# Patient Record
Sex: FEMALE | Race: WHITE | NOT HISPANIC OR LATINO
[De-identification: names, ages, dates, MRNs, and addresses within clinical notes are randomized per-mention and may not be internally consistent; named-entity substitution may affect disease eponyms.]

---

## 2018-12-31 ENCOUNTER — TRANSCRIPTION ENCOUNTER (OUTPATIENT)
Age: 23
End: 2018-12-31

## 2019-01-22 ENCOUNTER — TRANSCRIPTION ENCOUNTER (OUTPATIENT)
Age: 24
End: 2019-01-22

## 2019-05-26 ENCOUNTER — TRANSCRIPTION ENCOUNTER (OUTPATIENT)
Age: 24
End: 2019-05-26

## 2020-02-25 ENCOUNTER — APPOINTMENT (OUTPATIENT)
Dept: OBGYN | Facility: CLINIC | Age: 25
End: 2020-02-25

## 2021-01-19 ENCOUNTER — APPOINTMENT (OUTPATIENT)
Dept: OBGYN | Facility: CLINIC | Age: 26
End: 2021-01-19
Payer: MEDICAID

## 2021-01-19 PROCEDURE — 99211 OFF/OP EST MAY X REQ PHY/QHP: CPT

## 2021-01-19 PROCEDURE — 99072 ADDL SUPL MATRL&STAF TM PHE: CPT

## 2021-01-21 ENCOUNTER — TRANSCRIPTION ENCOUNTER (OUTPATIENT)
Age: 26
End: 2021-01-21

## 2021-09-09 ENCOUNTER — TRANSCRIPTION ENCOUNTER (OUTPATIENT)
Age: 26
End: 2021-09-09

## 2022-01-06 ENCOUNTER — TRANSCRIPTION ENCOUNTER (OUTPATIENT)
Age: 27
End: 2022-01-06

## 2022-07-14 ENCOUNTER — APPOINTMENT (OUTPATIENT)
Dept: OBGYN | Facility: CLINIC | Age: 27
End: 2022-07-14

## 2022-07-14 VITALS — WEIGHT: 120 LBS | DIASTOLIC BLOOD PRESSURE: 76 MMHG | SYSTOLIC BLOOD PRESSURE: 111 MMHG

## 2022-07-14 PROCEDURE — 99395 PREV VISIT EST AGE 18-39: CPT

## 2022-07-14 NOTE — HISTORY OF PRESENT ILLNESS
[FreeTextEntry1] : Patient is a 26 year old presenting for an annual visit. LMP 6/23/22. She states she used condom with  and now she feels yeast infection sxs. Denies urinary urgency, frequency and dysuria. Pt states she has been trying to conceive and has been taking PNV. She states she has hashimotos thyroiditis, she says TSH and T4 are wnl.

## 2022-07-14 NOTE — PLAN
[FreeTextEntry1] : 26 year old presenting for annual exam.\par -uterus is normal sized and mobile\par -f/u PAP, GC/CT, and BV panel done today\par -f/u preconception BW done today \par -contraception: none as pt is trying to conceive \par -advised pt to start taking folic acid with PNV\par -f/u PRN

## 2022-07-18 LAB
BASOPHILS # BLD AUTO: 0.07 K/UL
BASOPHILS NFR BLD AUTO: 0.9 %
C TRACH RRNA SPEC QL NAA+PROBE: NOT DETECTED
CANDIDA VAG CYTO: NOT DETECTED
CMV IGG SERPL QL: 7.8 U/ML
CMV IGG SERPL-IMP: POSITIVE
CMV IGM SERPL QL: <8 AU/ML
CMV IGM SERPL QL: NEGATIVE
EOSINOPHIL # BLD AUTO: 0.41 K/UL
EOSINOPHIL NFR BLD AUTO: 5.5 %
G VAGINALIS+PREV SP MTYP VAG QL MICRO: NOT DETECTED
HBV E AG SER QL: NONREACTIVE
HCT VFR BLD CALC: 36.7 %
HCV AB SER QL: NONREACTIVE
HCV S/CO RATIO: 0.13 S/CO
HGB BLD-MCNC: 11.9 G/DL
HIV1+2 AB SPEC QL IA.RAPID: NONREACTIVE
HPV 16 E6+E7 MRNA CVX QL NAA+PROBE: NOT DETECTED
HPV HIGH+LOW RISK DNA PNL CVX: NOT DETECTED
HPV18+45 E6+E7 MRNA CVX QL NAA+PROBE: NOT DETECTED
IMM GRANULOCYTES NFR BLD AUTO: 0.3 %
LYMPHOCYTES # BLD AUTO: 2.63 K/UL
LYMPHOCYTES NFR BLD AUTO: 35 %
MAN DIFF?: NORMAL
MCHC RBC-ENTMCNC: 29.1 PG
MCHC RBC-ENTMCNC: 32.4 GM/DL
MCV RBC AUTO: 89.7 FL
MONOCYTES # BLD AUTO: 0.61 K/UL
MONOCYTES NFR BLD AUTO: 8.1 %
N GONORRHOEA RRNA SPEC QL NAA+PROBE: NOT DETECTED
NEUTROPHILS # BLD AUTO: 3.77 K/UL
NEUTROPHILS NFR BLD AUTO: 50.2 %
PLATELET # BLD AUTO: 248 K/UL
RBC # BLD: 4.09 M/UL
RBC # FLD: 13.2 %
RUBV IGG FLD-ACNC: 21 INDEX
RUBV IGG SER-IMP: POSITIVE
RUBV IGM FLD-ACNC: <20 AU/ML
SOURCE AMPLIFICATION: NORMAL
T GONDII AB SER-IMP: NEGATIVE
T GONDII AB SER-IMP: NEGATIVE
T GONDII IGG SER QL: <3 IU/ML
T GONDII IGM SER QL: <3 AU/ML
T PALLIDUM AB SER QL IA: NEGATIVE
T VAGINALIS VAG QL WET PREP: NOT DETECTED
VZV AB TITR SER: POSITIVE
VZV IGG SER IF-ACNC: 2448 INDEX
WBC # FLD AUTO: 7.51 K/UL

## 2022-07-19 LAB
B19V IGG SER QL IA: 0.51 INDEX
B19V IGG+IGM SER-IMP: NEGATIVE
B19V IGG+IGM SER-IMP: NORMAL
B19V IGM FLD-ACNC: 0.08 INDEX
B19V IGM SER-ACNC: NEGATIVE
CYTOLOGY CVX/VAG DOC THIN PREP: NORMAL

## 2022-07-26 ENCOUNTER — APPOINTMENT (OUTPATIENT)
Dept: OBGYN | Facility: CLINIC | Age: 27
End: 2022-07-26

## 2022-08-31 ENCOUNTER — APPOINTMENT (OUTPATIENT)
Dept: OBGYN | Facility: CLINIC | Age: 27
End: 2022-08-31

## 2022-08-31 PROCEDURE — 0501F PRENATAL FLOW SHEET: CPT

## 2022-08-31 PROCEDURE — 36415 COLL VENOUS BLD VENIPUNCTURE: CPT

## 2022-08-31 NOTE — HISTORY OF PRESENT ILLNESS
[Patient reported PAP Smear was normal] : Patient reported PAP Smear was normal [Currently Active] : currently active [Men] : men [No] : No [FreeTextEntry1] : Patient is a 26 year old who presents after she had a positive home pregnancy test. LMP 7/21/22. Pt states she has been eating cold cuts and drank wine at least 1-2 times as she didn’t know she was pregnant. Sono done today reveals yolk sac with no +FHR as it is very early GA. \par  [PapSmeardate] : 7/2022

## 2022-08-31 NOTE — PLAN
[FreeTextEntry1] : -f/u prenatal blood work drawn today\par -RTO 2 weeks for first trimester ultrasound and review of prenatal lab results

## 2022-09-01 LAB
BASOPHILS # BLD AUTO: 0.06 K/UL
BASOPHILS NFR BLD AUTO: 0.7 %
EOSINOPHIL # BLD AUTO: 0.31 K/UL
EOSINOPHIL NFR BLD AUTO: 3.8 %
ESTIMATED AVERAGE GLUCOSE: 105 MG/DL
GLUCOSE SERPL-MCNC: 80 MG/DL
HAV IGM SER QL: NONREACTIVE
HBA1C MFR BLD HPLC: 5.3 %
HBV CORE IGM SER QL: NONREACTIVE
HBV SURFACE AG SER QL: NONREACTIVE
HCT VFR BLD CALC: 35.8 %
HCV AB SER QL: NONREACTIVE
HCV S/CO RATIO: 0.09 S/CO
HGB BLD-MCNC: 11.7 G/DL
HIV1+2 AB SPEC QL IA.RAPID: NONREACTIVE
IMM GRANULOCYTES NFR BLD AUTO: 0.4 %
LYMPHOCYTES # BLD AUTO: 2.71 K/UL
LYMPHOCYTES NFR BLD AUTO: 33.1 %
MAN DIFF?: NORMAL
MCHC RBC-ENTMCNC: 29 PG
MCHC RBC-ENTMCNC: 32.7 GM/DL
MCV RBC AUTO: 88.8 FL
MONOCYTES # BLD AUTO: 0.65 K/UL
MONOCYTES NFR BLD AUTO: 7.9 %
NEUTROPHILS # BLD AUTO: 4.43 K/UL
NEUTROPHILS NFR BLD AUTO: 54.1 %
PLATELET # BLD AUTO: 246 K/UL
RBC # BLD: 4.03 M/UL
RBC # FLD: 12.7 %
WBC # FLD AUTO: 8.19 K/UL

## 2022-09-05 LAB
ABO + RH PNL BLD: NORMAL
APPEARANCE: CLEAR
BACTERIA UR CULT: NORMAL
BACTERIA: NEGATIVE
BILIRUBIN URINE: NEGATIVE
BLD GP AB SCN SERPL QL: NORMAL
BLOOD URINE: NEGATIVE
CALCIUM OXALATE CRYSTALS: ABNORMAL
COLOR: YELLOW
GLUCOSE QUALITATIVE U: NEGATIVE
HYALINE CASTS: 0 /LPF
KETONES URINE: NORMAL
LEAD BLD-MCNC: <1 UG/DL
LEUKOCYTE ESTERASE URINE: NEGATIVE
M TB IFN-G BLD-IMP: NEGATIVE
MEV IGG FLD QL IA: 59.1 AU/ML
MEV IGG+IGM SER-IMP: POSITIVE
MICROSCOPIC-UA: NORMAL
MUV AB SER-ACNC: POSITIVE
MUV IGG SER QL IA: 39.3 AU/ML
NITRITE URINE: NEGATIVE
PH URINE: 6
PROTEIN URINE: NEGATIVE
QUANTIFERON TB PLUS MITOGEN MINUS NIL: >10 IU/ML
QUANTIFERON TB PLUS NIL: 0.1 IU/ML
QUANTIFERON TB PLUS TB1 MINUS NIL: -0.01 IU/ML
QUANTIFERON TB PLUS TB2 MINUS NIL: 0 IU/ML
RED BLOOD CELLS URINE: 1 /HPF
RUBV IGG FLD-ACNC: 19.2 INDEX
RUBV IGG SER-IMP: POSITIVE
SPECIFIC GRAVITY URINE: 1.02
SQUAMOUS EPITHELIAL CELLS: 4 /HPF
T PALLIDUM AB SER QL IA: NEGATIVE
UROBILINOGEN URINE: NORMAL
WHITE BLOOD CELLS URINE: 1 /HPF

## 2022-09-14 ENCOUNTER — APPOINTMENT (OUTPATIENT)
Dept: ANTEPARTUM | Facility: CLINIC | Age: 27
End: 2022-09-14

## 2022-09-14 ENCOUNTER — APPOINTMENT (OUTPATIENT)
Dept: OBGYN | Facility: CLINIC | Age: 27
End: 2022-09-14

## 2022-09-14 VITALS
HEIGHT: 61 IN | SYSTOLIC BLOOD PRESSURE: 106 MMHG | DIASTOLIC BLOOD PRESSURE: 71 MMHG | WEIGHT: 121 LBS | BODY MASS INDEX: 22.84 KG/M2

## 2022-09-14 PROCEDURE — 0502F SUBSEQUENT PRENATAL CARE: CPT

## 2022-09-14 PROCEDURE — 76801 OB US < 14 WKS SINGLE FETUS: CPT

## 2022-10-12 ENCOUNTER — APPOINTMENT (OUTPATIENT)
Dept: ANTEPARTUM | Facility: CLINIC | Age: 27
End: 2022-10-12

## 2022-10-12 ENCOUNTER — APPOINTMENT (OUTPATIENT)
Dept: OBGYN | Facility: CLINIC | Age: 27
End: 2022-10-12

## 2022-10-12 VITALS
HEIGHT: 61 IN | SYSTOLIC BLOOD PRESSURE: 105 MMHG | WEIGHT: 122 LBS | DIASTOLIC BLOOD PRESSURE: 65 MMHG | BODY MASS INDEX: 23.03 KG/M2

## 2022-10-12 PROCEDURE — 76813 OB US NUCHAL MEAS 1 GEST: CPT

## 2022-10-12 PROCEDURE — 0502F SUBSEQUENT PRENATAL CARE: CPT

## 2022-10-18 LAB
ADDITIONAL US: NORMAL
COMMENTS: AFP: NORMAL
CRL SCAN TWIN B: NORMAL
CRL SCAN: NORMAL
CROWN RUMP LENGTH TWIN B: NORMAL
CROWN RUMP LENGTH: 48.1 MM
DOWN SYNDROME AGE RISK: NORMAL
DOWN SYNDROME INTERPRETATION: NORMAL
DOWN SYNDROME SCREENING RISK: NORMAL
GEST. AGE ON COLLECTION DATE: 11.4 WEEKS
HCG MOM: 1.39
HCG VALUE: 178.8 IU/ML
MATERNAL AGE AT EDD: 26.5 YR
NOTE: AFP: NORMAL
NT MOM TWIN B: NORMAL
NT TWIN B: NORMAL
NUCHAL TRANSLUCENCY (NT): 1 MM
NUCHAL TRANSLUCENCY MOM: 0.79
NUMBER OF FETUSES: 1
PAPP-A MOM: 1.3
PAPP-A VALUE: 1032.7 NG/ML
RACE: NORMAL
RESULTS AFP: NORMAL
SONOGRAPHER ID#: NORMAL
SUBMIT PART 2 SAMPLE USING: NORMAL
TEST RESULTS: AFP: NORMAL
TRISOMY 18 AGE RISK: NORMAL
TRISOMY 18 INTERPRETATION: NORMAL
TRISOMY 18 SCREENING RISK: NORMAL
WEIGHT AFP: 122 LBS

## 2022-10-20 LAB
CLARI ADDITIONAL INFO: NORMAL
CLARI CHROMOSOME 13: NORMAL
CLARI CHROMOSOME 18: NORMAL
CLARI CHROMOSOME 21: NORMAL
CLARI SEX CHROMOSOMES: NORMAL
CLARI TEST COMMENT: NORMAL
CLARITEST NIPT: NORMAL
FETAL FRACT: NORMAL
GESTATION AGE: NORMAL
MATERNAL WEIGHT (LBS):: NORMAL
PLEASE INCLUDE GENDER RESULTS ON THIS REPORT:: NORMAL
TYPE OF PREGNANCY:: NORMAL

## 2022-11-07 ENCOUNTER — APPOINTMENT (OUTPATIENT)
Dept: OBGYN | Facility: CLINIC | Age: 27
End: 2022-11-07

## 2022-11-07 VITALS
SYSTOLIC BLOOD PRESSURE: 104 MMHG | WEIGHT: 124 LBS | DIASTOLIC BLOOD PRESSURE: 68 MMHG | BODY MASS INDEX: 23.41 KG/M2 | HEIGHT: 61 IN

## 2022-11-07 PROCEDURE — 0502F SUBSEQUENT PRENATAL CARE: CPT

## 2022-11-09 ENCOUNTER — NON-APPOINTMENT (OUTPATIENT)
Age: 27
End: 2022-11-09

## 2022-11-09 LAB
25(OH)D3 SERPL-MCNC: 35.3 NG/ML
T4 FREE SERPL-MCNC: 0.9 NG/DL
TSH SERPL-ACNC: 9.62 UIU/ML
VIT B12 SERPL-MCNC: 556 PG/ML

## 2022-11-10 ENCOUNTER — NON-APPOINTMENT (OUTPATIENT)
Age: 27
End: 2022-11-10

## 2022-11-10 LAB
ADDITIONAL US: NORMAL
AFP MOM: 1.01
AFP VALUE: 33.3 NG/ML
COLLECTED ON 2: NORMAL
COLLECTED ON: NORMAL
CRL SCAN TWIN B: NORMAL
CRL SCAN: NORMAL
CROWN RUMP LENGTH TWIN B: NORMAL
CROWN RUMP LENGTH: 48.1 MM
DIA MOM: 1.45
DIA VALUE: 275.7 PG/ML
DOWN SYNDROME AGE RISK: NORMAL
DOWN SYNDROME INTERPRETATION: NORMAL
DOWN SYNDROME SCREENING RISK: NORMAL
FIRST TRIMESTER SAMPLE: NORMAL
GEST. AGE ON COLLECTION DATE: 11.4 WEEKS
GESTATIONAL AGE: 15.1 WEEKS
HCG MOM: 1.59
HCG VALUE: 87.4 IU/ML
INSULIN DEP DIABETES: NO
MATERNAL AGE AT EDD: 26.5 YR
NT MOM TWIN B: NORMAL
NT TWIN B: NORMAL
NUCHAL TRANSLUCENCY (NT): 1 MM
NUCHAL TRANSLUCENCY MOM: 0.79
NUMBER OF FETUSES: 1
OPEN SPINA BIFIDA: NORMAL
OSB INTERPRETATION: NORMAL
PAPP-A MOM: 1.3
PAPP-A VALUE: 1032.7 NG/ML
RACE: NORMAL
SECOND TRIMESTER SAMPLE: NORMAL
SEQUENTIAL 2 COMMENTS: NORMAL
SEQUENTIAL 2 NOTE: NORMAL
SEQUENTIAL 2 RESULTS: NORMAL
SEQUENTIAL 2 TEST RESULTS: NORMAL
SONOGRAPHER ID#: NORMAL
TRISOMY 18 AGE RISK: NORMAL
TRISOMY 18 INTERPRETATION: NORMAL
TRISOMY 18 SCREENING RISK: NORMAL
UE3 MOM: 1.03
UE3 VALUE: 0.85 NG/ML
WEIGHT AFP: 122 LBS
WEIGHT: 124 LBS

## 2022-11-14 ENCOUNTER — APPOINTMENT (OUTPATIENT)
Dept: OBGYN | Facility: CLINIC | Age: 27
End: 2022-11-14

## 2022-11-14 VITALS — SYSTOLIC BLOOD PRESSURE: 96 MMHG | DIASTOLIC BLOOD PRESSURE: 56 MMHG | BODY MASS INDEX: 23.43 KG/M2 | WEIGHT: 124 LBS

## 2022-11-14 PROCEDURE — 99212 OFFICE O/P EST SF 10 MIN: CPT

## 2022-11-14 RX ORDER — LEVOTHYROXINE SODIUM 0.05 MG/1
50 TABLET ORAL DAILY
Qty: 30 | Refills: 3 | Status: ACTIVE | COMMUNITY
Start: 2022-11-14 | End: 1900-01-01

## 2022-11-15 LAB
T4 FREE SERPL-MCNC: 0.9 NG/DL
TSH SERPL-ACNC: 7.7 UIU/ML

## 2022-11-16 RX ORDER — LEVOTHYROXINE SODIUM 0.03 MG/1
25 TABLET ORAL DAILY
Qty: 90 | Refills: 2 | Status: ACTIVE | COMMUNITY
Start: 2022-11-15 | End: 1900-01-01

## 2022-12-07 ENCOUNTER — APPOINTMENT (OUTPATIENT)
Dept: OBGYN | Facility: CLINIC | Age: 27
End: 2022-12-07

## 2022-12-07 ENCOUNTER — APPOINTMENT (OUTPATIENT)
Dept: ANTEPARTUM | Facility: CLINIC | Age: 27
End: 2022-12-07

## 2022-12-07 VITALS
SYSTOLIC BLOOD PRESSURE: 102 MMHG | DIASTOLIC BLOOD PRESSURE: 68 MMHG | BODY MASS INDEX: 23.98 KG/M2 | HEIGHT: 61 IN | WEIGHT: 127 LBS

## 2022-12-07 PROCEDURE — 0502F SUBSEQUENT PRENATAL CARE: CPT

## 2022-12-07 PROCEDURE — 76805 OB US >/= 14 WKS SNGL FETUS: CPT

## 2022-12-08 ENCOUNTER — NON-APPOINTMENT (OUTPATIENT)
Age: 27
End: 2022-12-08

## 2022-12-08 LAB
T4 FREE SERPL-MCNC: 0.9 NG/DL
TSH SERPL-ACNC: 3.72 UIU/ML

## 2023-01-11 ENCOUNTER — APPOINTMENT (OUTPATIENT)
Dept: OBGYN | Facility: CLINIC | Age: 28
End: 2023-01-11
Payer: MEDICAID

## 2023-01-11 VITALS — DIASTOLIC BLOOD PRESSURE: 64 MMHG | SYSTOLIC BLOOD PRESSURE: 99 MMHG | WEIGHT: 133 LBS | BODY MASS INDEX: 25.13 KG/M2

## 2023-01-11 PROCEDURE — 36415 COLL VENOUS BLD VENIPUNCTURE: CPT

## 2023-01-11 PROCEDURE — 0502F SUBSEQUENT PRENATAL CARE: CPT

## 2023-01-12 LAB
BASOPHILS # BLD AUTO: 0.07 K/UL
BASOPHILS NFR BLD AUTO: 0.7 %
EOSINOPHIL # BLD AUTO: 0.27 K/UL
EOSINOPHIL NFR BLD AUTO: 2.6 %
GLUCOSE 1H P 50 G GLC PO SERPL-MCNC: 127 MG/DL
HCT VFR BLD CALC: 33.8 %
HGB BLD-MCNC: 10.6 G/DL
IMM GRANULOCYTES NFR BLD AUTO: 1.4 %
LYMPHOCYTES # BLD AUTO: 2.05 K/UL
LYMPHOCYTES NFR BLD AUTO: 19.5 %
MAN DIFF?: NORMAL
MCHC RBC-ENTMCNC: 29.9 PG
MCHC RBC-ENTMCNC: 31.4 GM/DL
MCV RBC AUTO: 95.2 FL
MONOCYTES # BLD AUTO: 0.66 K/UL
MONOCYTES NFR BLD AUTO: 6.3 %
NEUTROPHILS # BLD AUTO: 7.31 K/UL
NEUTROPHILS NFR BLD AUTO: 69.5 %
PLATELET # BLD AUTO: 220 K/UL
RBC # BLD: 3.55 M/UL
RBC # FLD: 14 %
TSH SERPL-ACNC: 3.6 UIU/ML
WBC # FLD AUTO: 10.51 K/UL

## 2023-01-12 RX ORDER — MULTIVIT-MIN/FOLIC/VIT K/LYCOP 400-300MCG
250 TABLET ORAL DAILY
Qty: 30 | Refills: 6 | Status: ACTIVE | COMMUNITY
Start: 2023-01-12 | End: 1900-01-01

## 2023-01-12 RX ORDER — FERROUS SULFATE TAB EC 325 MG (65 MG FE EQUIVALENT) 325 (65 FE) MG
325 (65 FE) TABLET DELAYED RESPONSE ORAL DAILY
Qty: 30 | Refills: 6 | Status: ACTIVE | COMMUNITY
Start: 2023-01-12 | End: 1900-01-01

## 2023-01-17 ENCOUNTER — APPOINTMENT (OUTPATIENT)
Dept: OBGYN | Facility: CLINIC | Age: 28
End: 2023-01-17
Payer: MEDICAID

## 2023-01-17 ENCOUNTER — NON-APPOINTMENT (OUTPATIENT)
Age: 28
End: 2023-01-17

## 2023-01-17 VITALS
SYSTOLIC BLOOD PRESSURE: 102 MMHG | HEART RATE: 99 BPM | BODY MASS INDEX: 26.43 KG/M2 | HEIGHT: 61 IN | WEIGHT: 140 LBS | DIASTOLIC BLOOD PRESSURE: 69 MMHG

## 2023-01-17 DIAGNOSIS — R42 DIZZINESS AND GIDDINESS: ICD-10-CM

## 2023-01-17 PROCEDURE — 36415 COLL VENOUS BLD VENIPUNCTURE: CPT

## 2023-01-17 PROCEDURE — 0502F SUBSEQUENT PRENATAL CARE: CPT

## 2023-01-18 LAB
FERRITIN SERPL-MCNC: 20 NG/ML
FOLATE SERPL-MCNC: >20 NG/ML
IRON SATN MFR SERPL: 23 %
IRON SERPL-MCNC: 94 UG/DL
T4 FREE SERPL-MCNC: 0.8 NG/DL
TIBC SERPL-MCNC: 412 UG/DL
UIBC SERPL-MCNC: 317 UG/DL
VIT B12 SERPL-MCNC: 499 PG/ML

## 2023-01-19 LAB — TSH SERPL-ACNC: 3.95 UIU/ML

## 2023-02-08 ENCOUNTER — APPOINTMENT (OUTPATIENT)
Dept: OBGYN | Facility: CLINIC | Age: 28
End: 2023-02-08
Payer: MEDICAID

## 2023-02-08 VITALS
WEIGHT: 141.13 LBS | BODY MASS INDEX: 26.67 KG/M2 | SYSTOLIC BLOOD PRESSURE: 103 MMHG | DIASTOLIC BLOOD PRESSURE: 71 MMHG

## 2023-02-08 PROCEDURE — 0502F SUBSEQUENT PRENATAL CARE: CPT

## 2023-02-27 ENCOUNTER — APPOINTMENT (OUTPATIENT)
Dept: OBGYN | Facility: CLINIC | Age: 28
End: 2023-02-27
Payer: MEDICAID

## 2023-02-27 ENCOUNTER — APPOINTMENT (OUTPATIENT)
Dept: ANTEPARTUM | Facility: CLINIC | Age: 28
End: 2023-02-27
Payer: MEDICAID

## 2023-02-27 VITALS — WEIGHT: 147 LBS | SYSTOLIC BLOOD PRESSURE: 106 MMHG | BODY MASS INDEX: 27.78 KG/M2 | DIASTOLIC BLOOD PRESSURE: 68 MMHG

## 2023-02-27 PROCEDURE — 0502F SUBSEQUENT PRENATAL CARE: CPT

## 2023-02-27 PROCEDURE — 76816 OB US FOLLOW-UP PER FETUS: CPT

## 2023-02-27 PROCEDURE — 76819 FETAL BIOPHYS PROFIL W/O NST: CPT | Mod: 59

## 2023-03-15 ENCOUNTER — NON-APPOINTMENT (OUTPATIENT)
Age: 28
End: 2023-03-15

## 2023-03-15 ENCOUNTER — APPOINTMENT (OUTPATIENT)
Dept: OBGYN | Facility: CLINIC | Age: 28
End: 2023-03-15
Payer: MEDICAID

## 2023-03-15 VITALS — DIASTOLIC BLOOD PRESSURE: 71 MMHG | WEIGHT: 146 LBS | SYSTOLIC BLOOD PRESSURE: 104 MMHG | BODY MASS INDEX: 27.59 KG/M2

## 2023-03-15 LAB
T4 FREE SERPL-MCNC: 0.8 NG/DL
TSH SERPL-ACNC: 3.04 UIU/ML

## 2023-03-15 PROCEDURE — 0502F SUBSEQUENT PRENATAL CARE: CPT

## 2023-03-15 RX ORDER — TERCONAZOLE 4 MG/G
0.4 CREAM VAGINAL
Qty: 1 | Refills: 0 | Status: ACTIVE | COMMUNITY
Start: 2023-03-15 | End: 1900-01-01

## 2023-03-29 ENCOUNTER — APPOINTMENT (OUTPATIENT)
Dept: OBGYN | Facility: CLINIC | Age: 28
End: 2023-03-29
Payer: MEDICAID

## 2023-03-29 VITALS — BODY MASS INDEX: 28.34 KG/M2 | DIASTOLIC BLOOD PRESSURE: 66 MMHG | SYSTOLIC BLOOD PRESSURE: 95 MMHG | WEIGHT: 150 LBS

## 2023-03-29 PROCEDURE — 0502F SUBSEQUENT PRENATAL CARE: CPT

## 2023-03-30 LAB — HIV1+2 AB SPEC QL IA.RAPID: NONREACTIVE

## 2023-04-03 LAB — B-HEM STREP SPEC QL CULT: NORMAL

## 2023-04-04 ENCOUNTER — APPOINTMENT (OUTPATIENT)
Dept: OBGYN | Facility: CLINIC | Age: 28
End: 2023-04-04
Payer: MEDICAID

## 2023-04-04 VITALS
SYSTOLIC BLOOD PRESSURE: 99 MMHG | HEIGHT: 61 IN | WEIGHT: 154 LBS | BODY MASS INDEX: 29.07 KG/M2 | DIASTOLIC BLOOD PRESSURE: 66 MMHG

## 2023-04-04 PROCEDURE — 0502F SUBSEQUENT PRENATAL CARE: CPT

## 2023-04-11 ENCOUNTER — APPOINTMENT (OUTPATIENT)
Dept: OBGYN | Facility: CLINIC | Age: 28
End: 2023-04-11
Payer: MEDICAID

## 2023-04-11 ENCOUNTER — APPOINTMENT (OUTPATIENT)
Dept: ANTEPARTUM | Facility: CLINIC | Age: 28
End: 2023-04-11
Payer: MEDICAID

## 2023-04-11 VITALS — SYSTOLIC BLOOD PRESSURE: 103 MMHG | DIASTOLIC BLOOD PRESSURE: 70 MMHG | BODY MASS INDEX: 29.67 KG/M2 | WEIGHT: 157 LBS

## 2023-04-11 PROCEDURE — 76816 OB US FOLLOW-UP PER FETUS: CPT

## 2023-04-11 PROCEDURE — 0502F SUBSEQUENT PRENATAL CARE: CPT

## 2023-04-11 PROCEDURE — 76819 FETAL BIOPHYS PROFIL W/O NST: CPT | Mod: 59

## 2023-04-19 ENCOUNTER — APPOINTMENT (OUTPATIENT)
Dept: OBGYN | Facility: CLINIC | Age: 28
End: 2023-04-19
Payer: MEDICAID

## 2023-04-19 VITALS — SYSTOLIC BLOOD PRESSURE: 109 MMHG | WEIGHT: 160 LBS | BODY MASS INDEX: 30.23 KG/M2 | DIASTOLIC BLOOD PRESSURE: 74 MMHG

## 2023-04-19 PROCEDURE — 0502F SUBSEQUENT PRENATAL CARE: CPT

## 2023-04-25 ENCOUNTER — APPOINTMENT (OUTPATIENT)
Dept: OBGYN | Facility: CLINIC | Age: 28
End: 2023-04-25
Payer: MEDICAID

## 2023-04-25 ENCOUNTER — NON-APPOINTMENT (OUTPATIENT)
Age: 28
End: 2023-04-25

## 2023-04-25 VITALS
SYSTOLIC BLOOD PRESSURE: 115 MMHG | DIASTOLIC BLOOD PRESSURE: 72 MMHG | BODY MASS INDEX: 29.63 KG/M2 | HEIGHT: 62 IN | WEIGHT: 161 LBS

## 2023-04-25 PROCEDURE — 0502F SUBSEQUENT PRENATAL CARE: CPT

## 2023-04-26 ENCOUNTER — NON-APPOINTMENT (OUTPATIENT)
Age: 28
End: 2023-04-26

## 2023-04-27 ENCOUNTER — NON-APPOINTMENT (OUTPATIENT)
Age: 28
End: 2023-04-27

## 2023-04-27 ENCOUNTER — APPOINTMENT (OUTPATIENT)
Dept: OBGYN | Facility: CLINIC | Age: 28
End: 2023-04-27
Payer: MEDICAID

## 2023-04-27 ENCOUNTER — APPOINTMENT (OUTPATIENT)
Dept: ANTEPARTUM | Facility: CLINIC | Age: 28
End: 2023-04-27
Payer: MEDICAID

## 2023-04-27 VITALS — BODY MASS INDEX: 29.81 KG/M2 | WEIGHT: 163 LBS | DIASTOLIC BLOOD PRESSURE: 73 MMHG | SYSTOLIC BLOOD PRESSURE: 110 MMHG

## 2023-04-27 PROCEDURE — 76818 FETAL BIOPHYS PROFILE W/NST: CPT

## 2023-04-27 PROCEDURE — 0502F SUBSEQUENT PRENATAL CARE: CPT

## 2023-04-28 ENCOUNTER — OUTPATIENT (OUTPATIENT)
Dept: INPATIENT UNIT | Facility: HOSPITAL | Age: 28
LOS: 1 days | Discharge: HOME | End: 2023-04-28
Payer: MEDICAID

## 2023-04-28 VITALS — HEART RATE: 92 BPM | SYSTOLIC BLOOD PRESSURE: 102 MMHG | DIASTOLIC BLOOD PRESSURE: 60 MMHG

## 2023-04-28 VITALS — TEMPERATURE: 98 F

## 2023-04-28 DIAGNOSIS — O26.899 OTHER SPECIFIED PREGNANCY RELATED CONDITIONS, UNSPECIFIED TRIMESTER: ICD-10-CM

## 2023-04-28 LAB
BASOPHILS # BLD AUTO: 0.07 K/UL — SIGNIFICANT CHANGE UP (ref 0–0.2)
BASOPHILS NFR BLD AUTO: 0.3 % — SIGNIFICANT CHANGE UP (ref 0–2)
EOSINOPHIL # BLD AUTO: 0.03 K/UL — SIGNIFICANT CHANGE UP (ref 0–0.5)
EOSINOPHIL NFR BLD AUTO: 0.1 % — SIGNIFICANT CHANGE UP (ref 0–6)
FLUAV AG NPH QL: SIGNIFICANT CHANGE UP
FLUBV AG NPH QL: SIGNIFICANT CHANGE UP
HCT VFR BLD CALC: 34.1 % — LOW (ref 34.5–45)
HGB BLD-MCNC: 11.5 G/DL — SIGNIFICANT CHANGE UP (ref 11.5–15.5)
IANC: 18.44 K/UL — HIGH (ref 1.8–7.4)
IMM GRANULOCYTES NFR BLD AUTO: 1.5 % — HIGH (ref 0–0.9)
LYMPHOCYTES # BLD AUTO: 2.14 K/UL — SIGNIFICANT CHANGE UP (ref 1–3.3)
LYMPHOCYTES # BLD AUTO: 9.6 % — LOW (ref 13–44)
MCHC RBC-ENTMCNC: 29.7 PG — SIGNIFICANT CHANGE UP (ref 27–34)
MCHC RBC-ENTMCNC: 33.7 GM/DL — SIGNIFICANT CHANGE UP (ref 32–36)
MCV RBC AUTO: 88.1 FL — SIGNIFICANT CHANGE UP (ref 80–100)
MONOCYTES # BLD AUTO: 1.38 K/UL — HIGH (ref 0–0.9)
MONOCYTES NFR BLD AUTO: 6.2 % — SIGNIFICANT CHANGE UP (ref 2–14)
NEUTROPHILS # BLD AUTO: 18.44 K/UL — HIGH (ref 1.8–7.4)
NEUTROPHILS NFR BLD AUTO: 82.3 % — HIGH (ref 43–77)
NRBC # BLD: 0 /100 WBCS — SIGNIFICANT CHANGE UP (ref 0–0)
NRBC # FLD: 0 K/UL — SIGNIFICANT CHANGE UP (ref 0–0)
PLATELET # BLD AUTO: 188 K/UL — SIGNIFICANT CHANGE UP (ref 150–400)
RBC # BLD: 3.87 M/UL — SIGNIFICANT CHANGE UP (ref 3.8–5.2)
RBC # FLD: 13.4 % — SIGNIFICANT CHANGE UP (ref 10.3–14.5)
RSV RNA NPH QL NAA+NON-PROBE: SIGNIFICANT CHANGE UP
SARS-COV-2 RNA SPEC QL NAA+PROBE: SIGNIFICANT CHANGE UP
WBC # BLD: 22.39 K/UL — HIGH (ref 3.8–10.5)
WBC # FLD AUTO: 22.39 K/UL — HIGH (ref 3.8–10.5)

## 2023-04-28 PROCEDURE — 59025 FETAL NON-STRESS TEST: CPT | Mod: 26

## 2023-04-28 PROCEDURE — 99221 1ST HOSP IP/OBS SF/LOW 40: CPT | Mod: 25

## 2023-04-28 NOTE — OB PROVIDER TRIAGE NOTE - NSOBPROVIDERNOTE_OBGYN_ALL_OB_FT
26 y/o  at 40 weeks presents with fever x 2 days and irregular contractions  - CBC and flu/covid panel ordered  - Patient declines a VE 26 y/o  at 40 weeks presents with fever x 2 days and irregular contractions  - Afebrile, vitals stable  - CBC and flu/covid panel ordered  - Patient declines a VE 26 y/o  at 40 weeks presents with fever x 2 days and irregular contractions  - Afebrile, vitals stable  - CBC and flu/covid panel ordered  - Patient declines a VE    - Flu/covid negative, patient feels well  - Reactive NST, BPP   - Patient stable for discharge home with adequate outpatient follow up  - Instructed patient to follow up with OB/GYN within 1 week  - Educated and discussed labor precautions  - Educated and discussed concerning signs/symptoms to call OB/GYN and return to L&D including but not limited to vaginal bleeding, leakage of fluid, painful contractions, decreased fetal movement, fever/chills, shortness of breath, chest pain, rash, difficulty ambulating, nausea/vomiting/diarrhea, worsening of symptoms  - Patient states she understands and agrees with assessment and plan, all questions answered  - Discussed with Dr. Atkins  - Patient discharged at 3:45am

## 2023-04-28 NOTE — OB PROVIDER TRIAGE NOTE - NSHPLABSRESULTS_GEN_ALL_CORE
Alert and oriented, no focal deficits, no motor or sensory deficits. CBC Full  -  ( 28 Apr 2023 01:59 )  WBC Count : 22.39 K/uL  RBC Count : 3.87 M/uL  Hemoglobin : 11.5 g/dL  Hematocrit : 34.1 %  Platelet Count - Automated : 188 K/uL  Mean Cell Volume : 88.1 fL  Mean Cell Hemoglobin : 29.7 pg  Mean Cell Hemoglobin Concentration : 33.7 gm/dL  Auto Neutrophil # : 18.44 K/uL  Auto Lymphocyte # : 2.14 K/uL  Auto Monocyte # : 1.38 K/uL  Auto Eosinophil # : 0.03 K/uL  Auto Basophil # : 0.07 K/uL  Auto Neutrophil % : 82.3 %  Auto Lymphocyte % : 9.6 %  Auto Monocyte % : 6.2 %  Auto Eosinophil % : 0.1 %  Auto Basophil % : 0.3 % CBC Full  -  ( 28 Apr 2023 01:59 )  WBC Count : 22.39 K/uL  RBC Count : 3.87 M/uL  Hemoglobin : 11.5 g/dL  Hematocrit : 34.1 %  Platelet Count - Automated : 188 K/uL  Mean Cell Volume : 88.1 fL  Mean Cell Hemoglobin : 29.7 pg  Mean Cell Hemoglobin Concentration : 33.7 gm/dL  Auto Neutrophil # : 18.44 K/uL  Auto Lymphocyte # : 2.14 K/uL  Auto Monocyte # : 1.38 K/uL  Auto Eosinophil # : 0.03 K/uL  Auto Basophil # : 0.07 K/uL  Auto Neutrophil % : 82.3 %  Auto Lymphocyte % : 9.6 %  Auto Monocyte % : 6.2 %  Auto Eosinophil % : 0.1 %  Auto Basophil % : 0.3 %    Flu/covid panel negative

## 2023-04-28 NOTE — OB PROVIDER TRIAGE NOTE - ADDITIONAL INSTRUCTIONS
- Patient stable for discharge home with adequate outpatient follow up  - Instructed patient to follow up with OB/GYN within 1 week  - Educated and discussed labor precautions  - Educated and discussed concerning signs/symptoms to call OB/GYN and return to L&D including but not limited to vaginal bleeding, leakage of fluid, painful contractions, decreased fetal movement, fever/chills, shortness of breath, chest pain, rash, difficulty ambulating, nausea/vomiting/diarrhea, worsening of symptoms  - Patient states she understands and agrees with assessment and plan, all questions answered

## 2023-04-28 NOTE — OB RN TRIAGE NOTE - FALL HARM RISK - UNIVERSAL INTERVENTIONS
Bed in lowest position, wheels locked, appropriate side rails in place/Call bell, personal items and telephone in reach/Instruct patient to call for assistance before getting out of bed or chair/Non-slip footwear when patient is out of bed/[place] to call system/Physically safe environment - no spills, clutter or unnecessary equipment/Purposeful Proactive Rounding/Room/bathroom lighting operational, light cord in reach Hydroxychloroquine Counseling:  I discussed with the patient that a baseline ophthalmologic exam is needed at the start of therapy and every year thereafter while on therapy. A CBC may also be warranted for monitoring.  The side effects of this medication were discussed with the patient, including but not limited to agranulocytosis, aplastic anemia, seizures, rashes, retinopathy, and liver toxicity. Patient instructed to call the office should any adverse effect occur.  The patient verbalized understanding of the proper use and possible adverse effects of Plaquenil.  All the patient's questions and concerns were addressed.

## 2023-04-28 NOTE — OB PROVIDER TRIAGE NOTE - NSHPPHYSICALEXAM_GEN_ALL_CORE
Vital Signs Last 24 Hrs  T(C): 37.1 (28 Apr 2023 00:57), Max: 37.1 (28 Apr 2023 00:51)  T(F): 98.8 (28 Apr 2023 00:57), Max: 98.8 (28 Apr 2023 00:57)  HR: 97 (28 Apr 2023 01:47) (89 - 105)  BP: 108/63 (28 Apr 2023 00:57) (108/63 - 108/63)  BP(mean): --  RR: 18 (28 Apr 2023 00:57) (18 - 18)  SpO2: 98% (28 Apr 2023 01:47) (98% - 99%)    Parameters below as of 28 Apr 2023 00:57  Patient On (Oxygen Delivery Method): room air    A&O x 3  Lungs: CTAB  Abd: gravid, soft nontender to palpation  EFM: baseline 125, moderate variability, + accels, no decels, ctx q 3-4min  SVE: patient declined  TAS: presentation, placenta, BPP JJ Vital Signs Last 24 Hrs  T(C): 37.1 (28 Apr 2023 00:57), Max: 37.1 (28 Apr 2023 00:51)  T(F): 98.8 (28 Apr 2023 00:57), Max: 98.8 (28 Apr 2023 00:57)  HR: 97 (28 Apr 2023 01:47) (89 - 105)  BP: 108/63 (28 Apr 2023 00:57) (108/63 - 108/63)  BP(mean): --  RR: 18 (28 Apr 2023 00:57) (18 - 18)  SpO2: 98% (28 Apr 2023 01:47) (98% - 99%)    Parameters below as of 28 Apr 2023 00:57  Patient On (Oxygen Delivery Method): room air    A&O x 3  Lungs: CTAB  Abd: gravid, soft nontender to palpation  EFM: baseline 125, moderate variability, + accels, no decels, ctx q 3-4min  reactive NST  SVE: patient declined  TAS: vertex presentation, anterior placenta, BPP 8/8 JJ 20.1  images saved on sonogram machine

## 2023-04-28 NOTE — OB PROVIDER TRIAGE NOTE - HISTORY OF PRESENT ILLNESS
26 y/o  at 40 weeks presents with fever x 2 days and irregular contractions. States it began  around 10pm with the highest temperature of 101F. States her fever broke a few hours later without any Tylenol but did have chills after. Patient was seen in the OB office yesterday morning and was found to be 1cm dilated. Patient states fever returned after the appointment with the highest temperature of 102F. Patient reports she had diarrhea and nausea before the fever began but none now. Denies headache, cough, sore throat, SOB, chest pain, dysuria, leakage of clear fluid, vaginal bleeding.

## 2023-05-01 ENCOUNTER — APPOINTMENT (OUTPATIENT)
Dept: ANTEPARTUM | Facility: CLINIC | Age: 28
End: 2023-05-01
Payer: MEDICAID

## 2023-05-01 ENCOUNTER — APPOINTMENT (OUTPATIENT)
Dept: OBGYN | Facility: CLINIC | Age: 28
End: 2023-05-01
Payer: MEDICAID

## 2023-05-01 VITALS — SYSTOLIC BLOOD PRESSURE: 113 MMHG | WEIGHT: 162 LBS | DIASTOLIC BLOOD PRESSURE: 80 MMHG | BODY MASS INDEX: 29.63 KG/M2

## 2023-05-01 PROCEDURE — 0502F SUBSEQUENT PRENATAL CARE: CPT

## 2023-05-01 PROCEDURE — 76816 OB US FOLLOW-UP PER FETUS: CPT

## 2023-05-01 PROCEDURE — 76818 FETAL BIOPHYS PROFILE W/NST: CPT | Mod: 59

## 2023-05-02 DIAGNOSIS — O26.893 OTHER SPECIFIED PREGNANCY RELATED CONDITIONS, THIRD TRIMESTER: ICD-10-CM

## 2023-05-02 DIAGNOSIS — Z20.822 CONTACT WITH AND (SUSPECTED) EXPOSURE TO COVID-19: ICD-10-CM

## 2023-05-02 DIAGNOSIS — Z3A.40 40 WEEKS GESTATION OF PREGNANCY: ICD-10-CM

## 2023-05-02 DIAGNOSIS — O48.0 POST-TERM PREGNANCY: ICD-10-CM

## 2023-05-02 DIAGNOSIS — R50.9 FEVER, UNSPECIFIED: ICD-10-CM

## 2023-05-02 DIAGNOSIS — O47.1 FALSE LABOR AT OR AFTER 37 COMPLETED WEEKS OF GESTATION: ICD-10-CM

## 2023-05-03 ENCOUNTER — APPOINTMENT (OUTPATIENT)
Dept: ANTEPARTUM | Facility: CLINIC | Age: 28
End: 2023-05-03
Payer: MEDICAID

## 2023-05-03 ENCOUNTER — ASOB RESULT (OUTPATIENT)
Age: 28
End: 2023-05-03

## 2023-05-03 ENCOUNTER — NON-APPOINTMENT (OUTPATIENT)
Age: 28
End: 2023-05-03

## 2023-05-03 ENCOUNTER — INPATIENT (INPATIENT)
Facility: HOSPITAL | Age: 28
LOS: 2 days | Discharge: HOME | End: 2023-05-06
Attending: OBSTETRICS & GYNECOLOGY | Admitting: OBSTETRICS & GYNECOLOGY
Payer: MEDICAID

## 2023-05-03 VITALS
SYSTOLIC BLOOD PRESSURE: 113 MMHG | DIASTOLIC BLOOD PRESSURE: 72 MMHG | RESPIRATION RATE: 16 BRPM | TEMPERATURE: 98 F | HEART RATE: 100 BPM | OXYGEN SATURATION: 99 %

## 2023-05-03 DIAGNOSIS — O26.899 OTHER SPECIFIED PREGNANCY RELATED CONDITIONS, UNSPECIFIED TRIMESTER: ICD-10-CM

## 2023-05-03 DIAGNOSIS — O42.90 PREMATURE RUPTURE OF MEMBRANES, UNSPECIFIED AS TO LENGTH OF TIME BETWEEN RUPTURE AND ONSET OF LABOR, UNSPECIFIED WEEKS OF GESTATION: ICD-10-CM

## 2023-05-03 LAB
BASOPHILS # BLD AUTO: 0.03 K/UL — SIGNIFICANT CHANGE UP (ref 0–0.2)
BASOPHILS NFR BLD AUTO: 0.2 % — SIGNIFICANT CHANGE UP (ref 0–2)
BLD GP AB SCN SERPL QL: NEGATIVE — SIGNIFICANT CHANGE UP
COVID-19 SPIKE DOMAIN AB INTERP: POSITIVE
COVID-19 SPIKE DOMAIN ANTIBODY RESULT: >250 U/ML — HIGH
EOSINOPHIL # BLD AUTO: 0.07 K/UL — SIGNIFICANT CHANGE UP (ref 0–0.5)
EOSINOPHIL NFR BLD AUTO: 0.5 % — SIGNIFICANT CHANGE UP (ref 0–6)
HCT VFR BLD CALC: 35.7 % — SIGNIFICANT CHANGE UP (ref 34.5–45)
HGB BLD-MCNC: 12.1 G/DL — SIGNIFICANT CHANGE UP (ref 11.5–15.5)
IANC: 9.97 K/UL — HIGH (ref 1.8–7.4)
IMM GRANULOCYTES NFR BLD AUTO: 1.1 % — HIGH (ref 0–0.9)
LYMPHOCYTES # BLD AUTO: 16 % — SIGNIFICANT CHANGE UP (ref 13–44)
LYMPHOCYTES # BLD AUTO: 2.1 K/UL — SIGNIFICANT CHANGE UP (ref 1–3.3)
MCHC RBC-ENTMCNC: 30 PG — SIGNIFICANT CHANGE UP (ref 27–34)
MCHC RBC-ENTMCNC: 33.9 GM/DL — SIGNIFICANT CHANGE UP (ref 32–36)
MCV RBC AUTO: 88.6 FL — SIGNIFICANT CHANGE UP (ref 80–100)
MONOCYTES # BLD AUTO: 0.78 K/UL — SIGNIFICANT CHANGE UP (ref 0–0.9)
MONOCYTES NFR BLD AUTO: 6 % — SIGNIFICANT CHANGE UP (ref 2–14)
NEUTROPHILS # BLD AUTO: 9.97 K/UL — HIGH (ref 1.8–7.4)
NEUTROPHILS NFR BLD AUTO: 76.2 % — SIGNIFICANT CHANGE UP (ref 43–77)
NRBC # BLD: 0 /100 WBCS — SIGNIFICANT CHANGE UP (ref 0–0)
NRBC # FLD: 0 K/UL — SIGNIFICANT CHANGE UP (ref 0–0)
PLATELET # BLD AUTO: 243 K/UL — SIGNIFICANT CHANGE UP (ref 150–400)
RBC # BLD: 4.03 M/UL — SIGNIFICANT CHANGE UP (ref 3.8–5.2)
RBC # FLD: 12.9 % — SIGNIFICANT CHANGE UP (ref 10.3–14.5)
RH IG SCN BLD-IMP: POSITIVE — SIGNIFICANT CHANGE UP
RH IG SCN BLD-IMP: POSITIVE — SIGNIFICANT CHANGE UP
SARS-COV-2 IGG+IGM SERPL QL IA: >250 U/ML — HIGH
SARS-COV-2 IGG+IGM SERPL QL IA: POSITIVE
WBC # BLD: 13.1 K/UL — HIGH (ref 3.8–10.5)
WBC # FLD AUTO: 13.1 K/UL — HIGH (ref 3.8–10.5)

## 2023-05-03 PROCEDURE — 76815 OB US LIMITED FETUS(S): CPT | Mod: 26

## 2023-05-03 RX ORDER — SODIUM CHLORIDE 9 MG/ML
1000 INJECTION, SOLUTION INTRAVENOUS
Refills: 0 | Status: DISCONTINUED | OUTPATIENT
Start: 2023-05-03 | End: 2023-05-04

## 2023-05-03 RX ORDER — OXYTOCIN 10 UNIT/ML
333.33 VIAL (ML) INJECTION
Qty: 20 | Refills: 0 | Status: DISCONTINUED | OUTPATIENT
Start: 2023-05-03 | End: 2023-05-04

## 2023-05-03 RX ORDER — CHLORHEXIDINE GLUCONATE 213 G/1000ML
1 SOLUTION TOPICAL ONCE
Refills: 0 | Status: COMPLETED | OUTPATIENT
Start: 2023-05-03 | End: 2023-05-03

## 2023-05-03 RX ORDER — CITRIC ACID/SODIUM CITRATE 300-500 MG
15 SOLUTION, ORAL ORAL EVERY 6 HOURS
Refills: 0 | Status: DISCONTINUED | OUTPATIENT
Start: 2023-05-03 | End: 2023-05-04

## 2023-05-03 RX ADMIN — CHLORHEXIDINE GLUCONATE 1 APPLICATION(S): 213 SOLUTION TOPICAL at 20:17

## 2023-05-03 NOTE — OB RN PATIENT PROFILE - TERM DELIVERIES, OB PROFILE
12/16/22 1330   Activity/Group Checklist   Group Other (Comment)  (art therapy)   Attendance Attended   Attendance Duration (min) 46-60   Interactions Interacted appropriately   Affect/Mood Blunted/flat   Goals Achieved Able to listen to others; Able to engage in interactions; Other (Comment)  (authentic, spontaneous self expression, connection and inisght) 0

## 2023-05-03 NOTE — OB PROVIDER H&P - ASSESSMENT
26y/o  at 40.5weeks admitted for rupture of membranes.    - Discussed with Dr. Marin & Dr. Rincon (PGY-3)  - Admit to Labor and Delivery  - Continuous EFM  - Clear diet, IV fluids  - No objection to blood transfusion  - Standard labs (T&S, CBC, RPR)  - Epidural PRN  - Induction/augmentation agent: buccal cytotec    -Risks, benefits, alternatives, and possible complications have been discussed in detail with the patient in her native language. All questions answered, all appropriate hospital consents were signed.   -Pt thoroughly counseled and educated on importance of induction for ruptured membranes. Pt desires to hold off on induction at this time. Dr. Marin aware.   -Pt educated on risk of infection from prolonged rupture of membranes. All questions answered. Pt verbalized understanding.     1620: Informed consent was obtained. The following was discussed:  - Induction/augmentation of labor: use of medication and/or cook balloon to begin or enhance labor  - Obstetrical management including internal fetal/contraction monitoring  - Normal vaginal delivery  - Possible  section

## 2023-05-03 NOTE — OB PROVIDER TRIAGE NOTE - HISTORY OF PRESENT ILLNESS
PNC: Dr. Jacob    28y/o  at 40.5weeks presents with c/o clear yellow tinged lof since 10pm last night 2023. Pt denies ctx, vb, and reports positive fm.    GBS negative 3/29/23    AP Course uncomplicated.  -hypothyroidism -  levothyroxine 50mcg - last took at 10am 5/3.  -23: efw 9lbs (4082g) per antepartum record by Dr. Jacob.

## 2023-05-03 NOTE — OB PROVIDER H&P - HISTORY OF PRESENT ILLNESS
PNC: Dr. Jacob    26y/o  at 40.5weeks presents with c/o clear yellow tinged lof since 10pm last night 2023. Pt denies ctx, vb, and reports positive fm.    GBS negative 3/29/23    AP Course uncomplicated.  -hypothyroidism -  levothyroxine 50mcg - last took at 10am 5/3.  -23: efw 9lbs (4082g) per antepartum record by Dr. Jacob.

## 2023-05-03 NOTE — OB PROVIDER H&P - NSLOWPPHRISK_OBGYN_A_OB
No previous uterine incision/[place] Pregnancy/Less than or equal to 4 previous vaginal births/No known bleeding disorder/No history of postpartum hemorrhage/No other PPH risks indicated

## 2023-05-03 NOTE — OB PROVIDER TRIAGE NOTE - NSHPPHYSICALEXAM_GEN_ALL_CORE
Vital Signs Last 24 Hrs  T(C): 36.6 (03 May 2023 14:47), Max: 36.6 (03 May 2023 12:27)  T(F): 97.9 (03 May 2023 14:47), Max: 97.9 (03 May 2023 12:27)  HR: 93 (03 May 2023 15:41) (88 - 104)  BP: 102/68 (03 May 2023 15:41) (100/61 - 117/71)  BP(mean): --  RR: 18 (03 May 2023 14:47) (16 - 18)  SpO2: 99% (03 May 2023 12:27) (99% - 99%)    Parameters below as of 03 May 2023 14:47  Patient On (Oxygen Delivery Method): room air    abdomen soft, non tender  HR regular rate rhythm  lungs clear, equal b/l  SSE: positive pooling, positive nitrazine, positive ferning  SVE: 2.5/70/-3  TAS: saved in asob, vertex, anterior placenta, mvp 2.4cm, 116bpm m-mode  nst: 135bpm, moderate variability, +accelerations, no decelerations. reactive nst  toco: irregular ctx

## 2023-05-03 NOTE — OB RN PATIENT PROFILE - FALL HARM RISK - UNIVERSAL INTERVENTIONS
Bed in lowest position, wheels locked, appropriate side rails in place/Call bell, personal items and telephone in reach/Instruct patient to call for assistance before getting out of bed or chair/Non-slip footwear when patient is out of bed/[place] to call system/Physically safe environment - no spills, clutter or unnecessary equipment/Purposeful Proactive Rounding/Room/bathroom lighting operational, light cord in reach

## 2023-05-03 NOTE — OB RN TRIAGE NOTE - CHIEF COMPLAINT QUOTE
leaking fluid since 10 pm and occasional contractions leaking fluid since 10 pm yesterday and occasional contractions

## 2023-05-03 NOTE — OB PROVIDER TRIAGE NOTE - NS_AMNISURE_OBGYN_ALL_OB
Received most recent office note from [facility]-dated 8/10/17:  No mention of cardiac issues per review of systems in note. Heart sounds noted to be RRR. \"HTN well controlled\". No mention of history of cardiomegaly. All documentation placed on chart for reference. Not Done

## 2023-05-03 NOTE — OB RN TRIAGE NOTE - FALL HARM RISK - UNIVERSAL INTERVENTIONS
Bed in lowest position, wheels locked, appropriate side rails in place/Call bell, personal items and telephone in reach/Instruct patient to call for assistance before getting out of bed or chair/Non-slip footwear when patient is out of bed/[place] to call system/Physically safe environment - no spills, clutter or unnecessary equipment/Purposeful Proactive Rounding/Room/bathroom lighting operational, light cord in reach

## 2023-05-03 NOTE — OB RN TRIAGE NOTE - NSICDXPASTMEDICALHX_GEN_ALL_CORE_FT
PAST MEDICAL HISTORY:  No pertinent past medical history      PAST MEDICAL HISTORY:  Hypothyroid

## 2023-05-04 ENCOUNTER — APPOINTMENT (OUTPATIENT)
Dept: OBGYN | Facility: CLINIC | Age: 28
End: 2023-05-04

## 2023-05-04 ENCOUNTER — TRANSCRIPTION ENCOUNTER (OUTPATIENT)
Age: 28
End: 2023-05-04

## 2023-05-04 ENCOUNTER — APPOINTMENT (OUTPATIENT)
Dept: ANTEPARTUM | Facility: CLINIC | Age: 28
End: 2023-05-04

## 2023-05-04 LAB
APPEARANCE UR: ABNORMAL
BASOPHILS # BLD AUTO: 0.04 K/UL — SIGNIFICANT CHANGE UP (ref 0–0.2)
BASOPHILS NFR BLD AUTO: 0.2 % — SIGNIFICANT CHANGE UP (ref 0–2)
BILIRUB UR-MCNC: NEGATIVE — SIGNIFICANT CHANGE UP
COLOR SPEC: YELLOW — SIGNIFICANT CHANGE UP
DIFF PNL FLD: ABNORMAL
EOSINOPHIL # BLD AUTO: 0 K/UL — SIGNIFICANT CHANGE UP (ref 0–0.5)
EOSINOPHIL NFR BLD AUTO: 0 % — SIGNIFICANT CHANGE UP (ref 0–6)
GLUCOSE UR QL: NEGATIVE — SIGNIFICANT CHANGE UP
HCT VFR BLD CALC: 36.2 % — SIGNIFICANT CHANGE UP (ref 34.5–45)
HGB BLD-MCNC: 12 G/DL — SIGNIFICANT CHANGE UP (ref 11.5–15.5)
IANC: 16.05 K/UL — HIGH (ref 1.8–7.4)
IMM GRANULOCYTES NFR BLD AUTO: 1.1 % — HIGH (ref 0–0.9)
KETONES UR-MCNC: ABNORMAL
LEUKOCYTE ESTERASE UR-ACNC: NEGATIVE — SIGNIFICANT CHANGE UP
LYMPHOCYTES # BLD AUTO: 1.22 K/UL — SIGNIFICANT CHANGE UP (ref 1–3.3)
LYMPHOCYTES # BLD AUTO: 6.6 % — LOW (ref 13–44)
MCHC RBC-ENTMCNC: 29.6 PG — SIGNIFICANT CHANGE UP (ref 27–34)
MCHC RBC-ENTMCNC: 33.1 GM/DL — SIGNIFICANT CHANGE UP (ref 32–36)
MCV RBC AUTO: 89.4 FL — SIGNIFICANT CHANGE UP (ref 80–100)
MONOCYTES # BLD AUTO: 0.91 K/UL — HIGH (ref 0–0.9)
MONOCYTES NFR BLD AUTO: 4.9 % — SIGNIFICANT CHANGE UP (ref 2–14)
NEUTROPHILS # BLD AUTO: 16.05 K/UL — HIGH (ref 1.8–7.4)
NEUTROPHILS NFR BLD AUTO: 87.2 % — HIGH (ref 43–77)
NITRITE UR-MCNC: NEGATIVE — SIGNIFICANT CHANGE UP
NRBC # BLD: 0 /100 WBCS — SIGNIFICANT CHANGE UP (ref 0–0)
NRBC # FLD: 0 K/UL — SIGNIFICANT CHANGE UP (ref 0–0)
PH UR: 6.5 — SIGNIFICANT CHANGE UP (ref 5–8)
PLATELET # BLD AUTO: 248 K/UL — SIGNIFICANT CHANGE UP (ref 150–400)
PROT UR-MCNC: ABNORMAL
RBC # BLD: 4.05 M/UL — SIGNIFICANT CHANGE UP (ref 3.8–5.2)
RBC # FLD: 13.1 % — SIGNIFICANT CHANGE UP (ref 10.3–14.5)
SP GR SPEC: 1.02 — SIGNIFICANT CHANGE UP (ref 1.01–1.05)
T PALLIDUM AB TITR SER: NEGATIVE — SIGNIFICANT CHANGE UP
UROBILINOGEN FLD QL: ABNORMAL
WBC # BLD: 18.43 K/UL — HIGH (ref 3.8–10.5)
WBC # FLD AUTO: 18.43 K/UL — HIGH (ref 3.8–10.5)

## 2023-05-04 PROCEDURE — 59400 OBSTETRICAL CARE: CPT | Mod: U9

## 2023-05-04 RX ORDER — DIBUCAINE 1 %
1 OINTMENT (GRAM) RECTAL EVERY 6 HOURS
Refills: 0 | Status: DISCONTINUED | OUTPATIENT
Start: 2023-05-04 | End: 2023-05-06

## 2023-05-04 RX ORDER — ACETAMINOPHEN 500 MG
3 TABLET ORAL
Qty: 0 | Refills: 0 | DISCHARGE
Start: 2023-05-04

## 2023-05-04 RX ORDER — HYDROCORTISONE 1 %
1 OINTMENT (GRAM) TOPICAL EVERY 6 HOURS
Refills: 0 | Status: DISCONTINUED | OUTPATIENT
Start: 2023-05-04 | End: 2023-05-06

## 2023-05-04 RX ORDER — DIPHENHYDRAMINE HCL 50 MG
25 CAPSULE ORAL EVERY 6 HOURS
Refills: 0 | Status: DISCONTINUED | OUTPATIENT
Start: 2023-05-04 | End: 2023-05-06

## 2023-05-04 RX ORDER — ACETAMINOPHEN 500 MG
1000 TABLET ORAL ONCE
Refills: 0 | Status: DISCONTINUED | OUTPATIENT
Start: 2023-05-04 | End: 2023-05-05

## 2023-05-04 RX ORDER — LEVOTHYROXINE SODIUM 125 MCG
1 TABLET ORAL
Refills: 0 | DISCHARGE

## 2023-05-04 RX ORDER — OXYCODONE HYDROCHLORIDE 5 MG/1
5 TABLET ORAL
Refills: 0 | Status: DISCONTINUED | OUTPATIENT
Start: 2023-05-04 | End: 2023-05-06

## 2023-05-04 RX ORDER — AER TRAVELER 0.5 G/1
1 SOLUTION RECTAL; TOPICAL EVERY 4 HOURS
Refills: 0 | Status: DISCONTINUED | OUTPATIENT
Start: 2023-05-04 | End: 2023-05-06

## 2023-05-04 RX ORDER — AMPICILLIN TRIHYDRATE 250 MG
2 CAPSULE ORAL ONCE
Refills: 0 | Status: COMPLETED | OUTPATIENT
Start: 2023-05-04 | End: 2023-05-04

## 2023-05-04 RX ORDER — LEVOTHYROXINE SODIUM 125 MCG
1 TABLET ORAL
Qty: 0 | Refills: 0 | DISCHARGE
Start: 2023-05-04

## 2023-05-04 RX ORDER — OXYCODONE HYDROCHLORIDE 5 MG/1
5 TABLET ORAL ONCE
Refills: 0 | Status: DISCONTINUED | OUTPATIENT
Start: 2023-05-04 | End: 2023-05-06

## 2023-05-04 RX ORDER — TETANUS TOXOID, REDUCED DIPHTHERIA TOXOID AND ACELLULAR PERTUSSIS VACCINE, ADSORBED 5; 2.5; 8; 8; 2.5 [IU]/.5ML; [IU]/.5ML; UG/.5ML; UG/.5ML; UG/.5ML
0.5 SUSPENSION INTRAMUSCULAR ONCE
Refills: 0 | Status: DISCONTINUED | OUTPATIENT
Start: 2023-05-04 | End: 2023-05-06

## 2023-05-04 RX ORDER — ONDANSETRON 8 MG/1
4 TABLET, FILM COATED ORAL ONCE
Refills: 0 | Status: COMPLETED | OUTPATIENT
Start: 2023-05-04 | End: 2023-05-04

## 2023-05-04 RX ORDER — SODIUM CHLORIDE 9 MG/ML
1000 INJECTION, SOLUTION INTRAVENOUS ONCE
Refills: 0 | Status: DISCONTINUED | OUTPATIENT
Start: 2023-05-04 | End: 2023-05-04

## 2023-05-04 RX ORDER — SIMETHICONE 80 MG/1
80 TABLET, CHEWABLE ORAL EVERY 4 HOURS
Refills: 0 | Status: DISCONTINUED | OUTPATIENT
Start: 2023-05-04 | End: 2023-05-06

## 2023-05-04 RX ORDER — AMPICILLIN TRIHYDRATE 250 MG
2 CAPSULE ORAL EVERY 6 HOURS
Refills: 0 | Status: DISCONTINUED | OUTPATIENT
Start: 2023-05-04 | End: 2023-05-04

## 2023-05-04 RX ORDER — AMPICILLIN TRIHYDRATE 250 MG
CAPSULE ORAL
Refills: 0 | Status: DISCONTINUED | OUTPATIENT
Start: 2023-05-04 | End: 2023-05-04

## 2023-05-04 RX ORDER — IBUPROFEN 200 MG
600 TABLET ORAL EVERY 6 HOURS
Refills: 0 | Status: COMPLETED | OUTPATIENT
Start: 2023-05-04 | End: 2024-04-01

## 2023-05-04 RX ORDER — OXYTOCIN 10 UNIT/ML
333.33 VIAL (ML) INJECTION
Qty: 20 | Refills: 0 | Status: DISCONTINUED | OUTPATIENT
Start: 2023-05-04 | End: 2023-05-05

## 2023-05-04 RX ORDER — LEVOTHYROXINE SODIUM 125 MCG
50 TABLET ORAL DAILY
Refills: 0 | Status: DISCONTINUED | OUTPATIENT
Start: 2023-05-04 | End: 2023-05-06

## 2023-05-04 RX ORDER — SODIUM CHLORIDE 9 MG/ML
3 INJECTION INTRAMUSCULAR; INTRAVENOUS; SUBCUTANEOUS EVERY 8 HOURS
Refills: 0 | Status: DISCONTINUED | OUTPATIENT
Start: 2023-05-04 | End: 2023-05-06

## 2023-05-04 RX ORDER — GENTAMICIN SULFATE 40 MG/ML
400 VIAL (ML) INJECTION ONCE
Refills: 0 | Status: COMPLETED | OUTPATIENT
Start: 2023-05-04 | End: 2023-05-04

## 2023-05-04 RX ORDER — PRAMOXINE HYDROCHLORIDE 150 MG/15G
1 AEROSOL, FOAM RECTAL EVERY 4 HOURS
Refills: 0 | Status: DISCONTINUED | OUTPATIENT
Start: 2023-05-04 | End: 2023-05-06

## 2023-05-04 RX ORDER — OXYTOCIN 10 UNIT/ML
2 VIAL (ML) INJECTION
Qty: 30 | Refills: 0 | Status: DISCONTINUED | OUTPATIENT
Start: 2023-05-04 | End: 2023-05-04

## 2023-05-04 RX ORDER — IBUPROFEN 200 MG
1 TABLET ORAL
Qty: 0 | Refills: 0 | DISCHARGE
Start: 2023-05-04

## 2023-05-04 RX ORDER — ACETAMINOPHEN 500 MG
975 TABLET ORAL
Refills: 0 | Status: DISCONTINUED | OUTPATIENT
Start: 2023-05-04 | End: 2023-05-06

## 2023-05-04 RX ORDER — MAGNESIUM HYDROXIDE 400 MG/1
30 TABLET, CHEWABLE ORAL
Refills: 0 | Status: DISCONTINUED | OUTPATIENT
Start: 2023-05-04 | End: 2023-05-06

## 2023-05-04 RX ORDER — BENZOCAINE 10 %
1 GEL (GRAM) MUCOUS MEMBRANE EVERY 6 HOURS
Refills: 0 | Status: DISCONTINUED | OUTPATIENT
Start: 2023-05-04 | End: 2023-05-06

## 2023-05-04 RX ORDER — KETOROLAC TROMETHAMINE 30 MG/ML
30 SYRINGE (ML) INJECTION ONCE
Refills: 0 | Status: DISCONTINUED | OUTPATIENT
Start: 2023-05-04 | End: 2023-05-05

## 2023-05-04 RX ORDER — LANOLIN
1 OINTMENT (GRAM) TOPICAL EVERY 6 HOURS
Refills: 0 | Status: DISCONTINUED | OUTPATIENT
Start: 2023-05-04 | End: 2023-05-06

## 2023-05-04 RX ADMIN — Medication 500 MILLIGRAM(S): at 15:54

## 2023-05-04 RX ADMIN — Medication 2 MILLIUNIT(S)/MIN: at 04:28

## 2023-05-04 RX ADMIN — ONDANSETRON 4 MILLIGRAM(S): 8 TABLET, FILM COATED ORAL at 05:18

## 2023-05-04 RX ADMIN — SODIUM CHLORIDE 3 MILLILITER(S): 9 INJECTION INTRAMUSCULAR; INTRAVENOUS; SUBCUTANEOUS at 21:00

## 2023-05-04 RX ADMIN — Medication 200 GRAM(S): at 11:40

## 2023-05-04 NOTE — OB PROVIDER LABOR PROGRESS NOTE - NS_SUBJECTIVE/OBJECTIVE_OBGYN_ALL_OB_FT
Patient seen for VE. Resting comfortably in bed. Denies pain.  VS  T(C): 36.7 (05-04-23 @ 01:00)  HR: 81 (05-04-23 @ 00:51)  BP: 94/56 (05-04-23 @ 00:51)  RR: 16 (05-04-23 @ 01:00)  SpO2: 99% (05-03-23 @ 12:27)
Pt seen and examined at bedside.    Vital Signs Last 24 Hrs  T(C): 36.7 (04 May 2023 05:00), Max: 37.0 (03 May 2023 17:00)  T(F): 98.06 (04 May 2023 05:00), Max: 98.6 (03 May 2023 17:00)  HR: 78 (04 May 2023 09:13) (51 - 104)  BP: 132/64 (04 May 2023 09:02) (94/56 - 213/136)  BP(mean): --  RR: 17 (04 May 2023 05:00) (15 - 18)  SpO2: 100% (04 May 2023 09:08) (79% - 100%)    Parameters below as of 03 May 2023 14:47  Patient On (Oxygen Delivery Method): room air
Pt seen and examined at bedside.    Vital Signs Last 24 Hrs  T(C): 37.6 (04 May 2023 10:20), Max: 37.6 (04 May 2023 10:20)  T(F): 99.68 (04 May 2023 10:20), Max: 99.68 (04 May 2023 10:20)  HR: 103 (04 May 2023 11:33) (51 - 119)  BP: 121/73 (04 May 2023 11:03) (94/56 - 213/136)  BP(mean): --  RR: 17 (04 May 2023 10:20) (15 - 18)  SpO2: 94% (04 May 2023 11:33) (79% - 100%)    Parameters below as of 03 May 2023 14:47  Patient On (Oxygen Delivery Method): room air
Patient seen for VE prior to start of IOL. Patient comfortable. No complaints.  VS  T(C): 36.6 (05-03-23 @ 21:00)  HR: 75 (05-03-23 @ 20:57)  BP: 100/57 (05-03-23 @ 20:57)  RR: 16 (05-03-23 @ 21:00)  SpO2: 99% (05-03-23 @ 12:27)
Pt evaluated for cervical change

## 2023-05-04 NOTE — DISCHARGE NOTE OB - NS MD DC FALL RISK RISK
For information on Fall & Injury Prevention, visit: [email or web address] OR  [email or web address] OR  https://www.cdc.gov/steadi/patient.html

## 2023-05-04 NOTE — OB PROVIDER LABOR PROGRESS NOTE - ASSESSMENT
Cervical change noted  Consider pitocin   Cont EFM/TOCO  Will reassess PRN    jeremie Dumont NP
IOL for PROM@10p on 5/2.    -Labor: pt fully dilated, will start pushing after tracing cat 1 and recovered  -Fetus: cat 2 with prolonged decel, recovered after pausing pitocin and repositioning  -GBS: neg  -Pain: epidural in place, effective  -Pt with rectal temp 101.3F, will give A/G/T for chorio    D/w Dr. Milly Ruano PGY1
Pt to get epidural for pain management    Charles Helm PGY4 
IOL for PROM@10p on 5/2.    -Labor: pt making cervical change, continue pitocin  -Fetus: cat 2 with intermittent late decels, mod variability, overall reassuring  -GBS: neg  -Pain: epidural in place, effective    D/w Dr. Milly Ruano PGY1
 @40.5wks PROM IOL  VE unchanged  Patient for buccal cytotec  Cont EFM/TOCO  Will reassess PRN    jeremie Dumont NP

## 2023-05-04 NOTE — DISCHARGE NOTE OB - CARE PROVIDER_API CALL
Jamarcus Jacob)  MaternalFetal Medicine; Obstetrics and Gynecology  [address] [place] [id number]  Phone: [phone]  Fax: [phone]  Follow Up Time:

## 2023-05-04 NOTE — CHART NOTE - NSCHARTNOTEFT_GEN_A_CORE
OBGYN Attending      PTA completed due to diagnosis of chorioamnionitis.  Pt is fully dilated.  Will send blood and urine cultures and then treat with ampicillin, gentamicin and give tylenol for fever.    Jackson Atkins MD

## 2023-05-04 NOTE — OB NEONATOLOGY/PEDIATRICIAN DELIVERY SUMMARY - NSPEDSNEONOTESA_OBGYN_ALL_OB_FT
Peds called to LDR for heavy meconium; suspected chorioamnionitis. 40+5 wk AGA male born via  to a 27 y/o  mother.  Maternal history of hypothyroidism (developed during pregnancy) on Synthroid. Maternal labs include Blood Type A+, HIV - , RPR NR , Rubella I , Hep B - , GBS - on 3/29, COVID -. SROM at 2200 on  with meconium fluids (ROM hours: 40H).  Baby emerged vigorous, crying, was w/d/s/s with APGARS of 8/9. Resuscitation included: routine  care and deep suction. Mom plans to initiate breastfeeding, declines Hep B vaccine and consents circ.  Highest maternal temp: 38.3. EOS 3.20, so baby transferred to NICU.    : 23  TOB: 1357    Physical Exam:  Gen: no acute distress, +grimace  HEENT:  anterior fontanel open soft and flat, nondysmorphic facies, no cleft lip/palate, ears normal set, no ear pits or tags, nares clinically patent  Resp: Normal respiratory effort without grunting or retractions, good air entry b/l, clear to auscultation bilaterally  Cardio: Present S1/S2, regular rate and rhythm, no murmurs  Abd: soft, non tender, non distended, umbilical cord with 3 vessels  Neuro: +palmar and plantar grasp, +suck, +mary kay, normal tone  Extremities: negative cornejo and ortolani maneuvers, moving all extremities, no clavicular crepitus or stepoff  Skin: pink, warm  Genitals: Normal male anatomy, testicles palpable in scrotum b/l, Ronald 1, anus patent Peds called to LDR for heavy meconium; suspected chorioamnionitis. 40+6 wk AGA male born via  to a 27 y/o  mother.  Maternal history of hypothyroidism (developed during pregnancy) on Synthroid. Maternal labs include Blood Type A+, HIV - , RPR NR , Rubella I , Hep B - , GBS - on 3/29 (), COVID -. SROM at 2200 on  with meconium fluids (ROM hours: 40H).  Baby emerged vigorous, crying, was w/d/s/s with APGARS of 8/9. Resuscitation included: routine  care and deep suction. Mom plans to initiate breastfeeding, declines Hep B vaccine and consents circ.  Highest maternal temp: 38.3. EOS 3.20, so baby transferred to NICU.    : 24  TOB: 1351    Physical Exam:  Gen: no acute distress, +grimace  HEENT:  anterior fontanel open soft and flat, nondysmorphic facies, no cleft lip/palate, ears normal set, no ear pits or tags, nares clinically patent  Resp: Normal respiratory effort without grunting or retractions, good air entry b/l, clear to auscultation bilaterally  Cardio: Present S1/S2, regular rate and rhythm, no murmurs  Abd: soft, non tender, non distended, umbilical cord with 3 vessels  Neuro: +palmar and plantar grasp, +suck, +mary kay, normal tone  Extremities: negative cornejo and ortolani maneuvers, moving all extremities, no clavicular crepitus or stepoff  Skin: pink, warm  Genitals: Normal male anatomy, testicles palpable in scrotum b/l, Ronald 1, anus patent

## 2023-05-04 NOTE — OB RN DELIVERY SUMMARY - NSSELHIDDEN_OBGYN_ALL_OB_FT
[NS_DeliveryAttending1_OBGYN_ALL_OB_FT:[id number]],[NS_DeliveryAssist1_OBGYN_ALL_OB_FT:[id number]] [NS_DeliveryAttending1_OBGYN_ALL_OB_FT:[id number]],[NS_DeliveryAssist1_OBGYN_ALL_OB_FT:[id number]],[NS_DeliveryRN_OBGYN_ALL_OB_FT:[id number]]

## 2023-05-04 NOTE — OB PROVIDER DELIVERY SUMMARY - NSPROVIDERDELIVERYNOTE_OBGYN_ALL_OB_FT
Spontaneous vaginal delivery of liveborn infant from RUBIN position. Head, shoulders, and body delivered easily. Infant was suctioned. No mec. Delayed cord clamping was performed and infant was handed to peds. Placenta delivered intact. Bimanual uterine massage and exploration were performed and uterine fundus was found to be firm. Vaginal exam revealed an intact cervix, vaginal walls and sulci. Patient had two 1st degree lacerations in the perineum that were repaired with 2.0 chromic suture in a running fashion. L labial laceration was repaired with 2.0 vicryl suture. Excellent hemostasis was noted. Patient was stable. Count was correct x 2. .    Dr. Atkins present at delivery  Regina Ruano PGY1

## 2023-05-04 NOTE — OB PROVIDER DELIVERY SUMMARY - NSSELHIDDEN_OBGYN_ALL_OB_FT
[NS_DeliveryAttending1_OBGYN_ALL_OB_FT:[id number]],[NS_DeliveryAssist1_OBGYN_ALL_OB_FT:[id number]]

## 2023-05-04 NOTE — OB RN DELIVERY SUMMARY - NS_SEPSISRSKCALC_OBGYN_ALL_OB_FT
EOS calculated successfully. EOS Risk Factor: 0.5/1000 live births ([facility] national incidence); GA=40w6d; Temp=100.94; ROM=39.85; GBS='Negative'; Antibiotics='No antibiotics or any antibiotics < 2 hrs prior to birth'

## 2023-05-04 NOTE — DISCHARGE NOTE OB - MEDICATION SUMMARY - MEDICATIONS TO TAKE
I will START or STAY ON the medications listed below when I get home from the hospital:    ibuprofen 600 mg oral tablet  -- 1 tab(s) by mouth every 6 hours  -- Indication: For     acetaminophen 325 mg oral tablet  -- 3 tab(s) by mouth every 6 hours  -- Indication: For     levothyroxine 50 mcg (0.05 mg) oral tablet  -- 1 tab(s) by mouth once a day  -- Indication: For Home med

## 2023-05-04 NOTE — OB RN DELIVERY SUMMARY - NS_LABORCHARACTER_OBGYN_ALL_OB
Febrile (>38C)/Internal electronic FM/External electronic FM/Antibiotics in labor/Meconium staining/Chorioamnionitis Induction of labor-Medicinal/Augmentation of labor/Febrile (>38C)/Internal electronic FM/External electronic FM/Antibiotics in labor/Meconium staining/Chorioamnionitis

## 2023-05-04 NOTE — DISCHARGE NOTE OB - PATIENT PORTAL LINK FT
You can access the FollowMyHealth Patient Portal offered by [facility] by registering at the following website: [email or web address]. By joining [facility]’s FollowMyHealth portal, you will also be able to view your health information using other applications (apps) compatible with our system.

## 2023-05-04 NOTE — DISCHARGE NOTE OB - CARE PLAN
Principal Discharge DX:	Vaginal delivery  Assessment and plan of treatment:	Reg diet, OOB, analgesia PRN, pelvic rest   1

## 2023-05-04 NOTE — DISCHARGE NOTE OB - MATERIALS PROVIDED
Breastfeeding Log/Guide to Postpartum Care/Back To Sleep Handout/Shaken Baby Prevention Handout/Breastfeeding Guide and Packet

## 2023-05-05 LAB
CULTURE RESULTS: SIGNIFICANT CHANGE UP
SPECIMEN SOURCE: SIGNIFICANT CHANGE UP

## 2023-05-05 RX ORDER — IBUPROFEN 200 MG
600 TABLET ORAL EVERY 6 HOURS
Refills: 0 | Status: DISCONTINUED | OUTPATIENT
Start: 2023-05-05 | End: 2023-05-06

## 2023-05-05 RX ADMIN — SODIUM CHLORIDE 3 MILLILITER(S): 9 INJECTION INTRAMUSCULAR; INTRAVENOUS; SUBCUTANEOUS at 04:59

## 2023-05-05 RX ADMIN — SODIUM CHLORIDE 3 MILLILITER(S): 9 INJECTION INTRAMUSCULAR; INTRAVENOUS; SUBCUTANEOUS at 14:00

## 2023-05-05 NOTE — PROGRESS NOTE ADULT - SUBJECTIVE AND OBJECTIVE BOX
S: 26yo PPD#1 s/p  c/g chorio s/p antibiotics. Patient feels well. Pain is well controlled. She is tolerating a regular diet and passing flatus. She is voiding spontaneously, and ambulating without difficulty. Denies CP/SOB. Denies lightheadedness/dizziness. Denies N/V.    O:  Vitals:  Vital Signs Last 24 Hrs  T(C): 37.6 (05 May 2023 05:12), Max: 37.6 (05 May 2023 05:12)  T(F): 99.6 (05 May 2023 05:12), Max: 99.6 (05 May 2023 05:12)  HR: 92 (05 May 2023 05:12) (68 - 96)  BP: 104/57 (05 May 2023 05:12) (97/52 - 118/55)  BP(mean): --  RR: 18 (05 May 2023 05:12) (17 - 18)  SpO2: 100% (05 May 2023 05:12) (94% - 100%)    Parameters below as of 05 May 2023 05:12  Patient On (Oxygen Delivery Method): room air        MEDICATIONS  (STANDING):  acetaminophen     Tablet .. 975 milliGRAM(s) Oral <User Schedule>  diphtheria/tetanus/pertussis (acellular) Vaccine (Adacel) 0.5 milliLiter(s) IntraMuscular once  ibuprofen  Tablet. 600 milliGRAM(s) Oral every 6 hours  levothyroxine 50 MICROGram(s) Oral daily  prenatal multivitamin 1 Tablet(s) Oral daily  sodium chloride 0.9% lock flush 3 milliLiter(s) IV Push every 8 hours      Labs:  Blood type: A Positive  Rubella IgG: RPR: Negative                          12.0   18.43<H> >-----------< 248    (  @ 12:23 )             36.2                        12.1   13.10<H> >-----------< 243    (  @ 15:30 )             35.7      Physical Exam:  General: NAD  Abdomen: soft, non-tender, non-distended, fundus firm  Vaginal: Lochia wnl  Extremities: No calf tenderness    A/P: 26yo PPD#1 s/p  c/b chorio. Patient is stable and doing well post-partum.     #Chorioamnionitis  -Afebrile  -s/p antibiotics  -WBC 18.43, afebrile  -Blood cultures/ Urine cultures pending results    #Postpartum  - Pain well controlled, continue current pain regimen  - Increase ambulation, SCDs when not ambulating  - Continue regular diet  - Discharge planning    Karlie PERES

## 2023-05-06 VITALS
HEART RATE: 68 BPM | RESPIRATION RATE: 18 BRPM | DIASTOLIC BLOOD PRESSURE: 74 MMHG | OXYGEN SATURATION: 100 % | TEMPERATURE: 99 F | SYSTOLIC BLOOD PRESSURE: 122 MMHG

## 2023-05-06 LAB
BASOPHILS # BLD AUTO: 0.05 K/UL — SIGNIFICANT CHANGE UP (ref 0–0.2)
BASOPHILS NFR BLD AUTO: 0.2 % — SIGNIFICANT CHANGE UP (ref 0–2)
EOSINOPHIL # BLD AUTO: 0.22 K/UL — SIGNIFICANT CHANGE UP (ref 0–0.5)
EOSINOPHIL NFR BLD AUTO: 1.1 % — SIGNIFICANT CHANGE UP (ref 0–6)
HCT VFR BLD CALC: 27.8 % — LOW (ref 34.5–45)
HGB BLD-MCNC: 9.4 G/DL — LOW (ref 11.5–15.5)
IANC: 15.92 K/UL — HIGH (ref 1.8–7.4)
IMM GRANULOCYTES NFR BLD AUTO: 2.1 % — HIGH (ref 0–0.9)
LYMPHOCYTES # BLD AUTO: 13.9 % — SIGNIFICANT CHANGE UP (ref 13–44)
LYMPHOCYTES # BLD AUTO: 2.84 K/UL — SIGNIFICANT CHANGE UP (ref 1–3.3)
MCHC RBC-ENTMCNC: 29.5 PG — SIGNIFICANT CHANGE UP (ref 27–34)
MCHC RBC-ENTMCNC: 33.8 GM/DL — SIGNIFICANT CHANGE UP (ref 32–36)
MCV RBC AUTO: 87.1 FL — SIGNIFICANT CHANGE UP (ref 80–100)
MONOCYTES # BLD AUTO: 0.91 K/UL — HIGH (ref 0–0.9)
MONOCYTES NFR BLD AUTO: 4.5 % — SIGNIFICANT CHANGE UP (ref 2–14)
NEUTROPHILS # BLD AUTO: 15.92 K/UL — HIGH (ref 1.8–7.4)
NEUTROPHILS NFR BLD AUTO: 78.2 % — HIGH (ref 43–77)
NRBC # BLD: 0 /100 WBCS — SIGNIFICANT CHANGE UP (ref 0–0)
NRBC # FLD: 0 K/UL — SIGNIFICANT CHANGE UP (ref 0–0)
PLATELET # BLD AUTO: 231 K/UL — SIGNIFICANT CHANGE UP (ref 150–400)
RBC # BLD: 3.19 M/UL — LOW (ref 3.8–5.2)
RBC # FLD: 13.2 % — SIGNIFICANT CHANGE UP (ref 10.3–14.5)
WBC # BLD: 20.36 K/UL — HIGH (ref 3.8–10.5)
WBC # FLD AUTO: 20.36 K/UL — HIGH (ref 3.8–10.5)

## 2023-05-06 PROCEDURE — 93010 ELECTROCARDIOGRAM REPORT: CPT

## 2023-05-06 RX ORDER — FAMOTIDINE 10 MG/ML
20 INJECTION INTRAVENOUS ONCE
Refills: 0 | Status: COMPLETED | OUTPATIENT
Start: 2023-05-06 | End: 2023-05-06

## 2023-05-06 RX ADMIN — FAMOTIDINE 20 MILLIGRAM(S): 10 INJECTION INTRAVENOUS at 23:04

## 2023-05-06 RX ADMIN — Medication 50 MICROGRAM(S): at 08:04

## 2023-05-06 RX ADMIN — SIMETHICONE 80 MILLIGRAM(S): 80 TABLET, CHEWABLE ORAL at 21:42

## 2023-05-06 RX ADMIN — Medication 600 MILLIGRAM(S): at 22:10

## 2023-05-06 RX ADMIN — Medication 600 MILLIGRAM(S): at 21:40

## 2023-05-06 NOTE — PROGRESS NOTE ADULT - NS ATTEND AMEND GEN_ALL_CORE FT
Pt seen and examined and agree with above assessment and plan.  Pt doing clinically well and stable for discharge.  Precautions reviewed.

## 2023-05-06 NOTE — CHART NOTE - NSCHARTNOTEFT_GEN_A_CORE
Patient evaluated due to call from RN that patient with complaint of chest pain.  Per patient, chest pain started this AM, but she thought it would pass with time so did not alert RN.  Patient reports pain is "crampy" and is located midepigastrically and radiates under bilateral breasts, down to her upper abdomen and up towards her throat.  Reports pain is worse with sitting up, and is relieved with ambulation.  Denies shortness of breath but endorses some discomfort with deep breaths under bilateral breasts and states it feels irritated. Has been ambulating, passing flatus, tolerating regular diet, voiding.  Denies lightheadedness or dizziness, nausea or vomiting.  No pain in lower extremities    Vital Signs Last 24 Hrs  T(C): 37.1 (06 May 2023 21:44), Max: 37.1 (06 May 2023 21:44)  T(F): 98.7 (06 May 2023 21:44), Max: 98.7 (06 May 2023 21:44)  HR: 63 (06 May 2023 21:44) (63 - 74)  BP: 120/70 (06 May 2023 21:44) (120/70 - 123/79)  BP(mean): --  RR: 17 (06 May 2023 21:44) (17 - 18)  SpO2: 99% (06 May 2023 21:44) (99% - 100%)    Parameters below as of 06 May 2023 21:44  Patient On (Oxygen Delivery Method): room air    EXAM:  Gen: resting in bed comfortably in NAD, no labored breathing on RA  CV: RRR, pain minimally reproducible with palpation at epigastric region  Lungs: CTAB  Abd: soft, nontender, nondistended, fundus firm  : no bleeding on pad    A/P: Patient is PPD#2 from  c/b chorioamnionitis with chest pain since this AM.  VSS.  Pain unchanged since AM.  Pain likely 2/2 to heartburn vs MSK soreness  - Recently received Motrin and Simethicone from RN, reports minimal improvement with meds  - Recommend Pepcid  - EKG to evaluate chest pain    d/w Dr. Milly Abebe PGY1 Patient evaluated due to call from RN that patient with complaint of chest pain.  Per patient, chest pain started this AM, but she thought it would pass with time so did not alert RN.  Patient reports pain is "crampy" and is located midepigastrically and radiates under bilateral breasts, down to her upper abdomen and up towards her throat.  Reports pain is worse with sitting up, and is relieved with ambulation.  Denies shortness of breath but endorses some discomfort with deep breaths under bilateral breasts and states it feels irritated. Has been ambulating, passing flatus, tolerating regular diet, voiding.  Denies lightheadedness or dizziness, nausea or vomiting.  No pain in lower extremities    Vital Signs Last 24 Hrs  T(C): 37.1 (06 May 2023 21:44), Max: 37.1 (06 May 2023 21:44)  T(F): 98.7 (06 May 2023 21:44), Max: 98.7 (06 May 2023 21:44)  HR: 63 (06 May 2023 21:44) (63 - 74)  BP: 120/70 (06 May 2023 21:44) (120/70 - 123/79)  BP(mean): --  RR: 17 (06 May 2023 21:44) (17 - 18)  SpO2: 99% (06 May 2023 21:44) (99% - 100%)    Parameters below as of 06 May 2023 21:44  Patient On (Oxygen Delivery Method): room air    EXAM:  Gen: resting in bed comfortably in NAD, no labored breathing on RA  CV: RRR, pain minimally reproducible with palpation at epigastric region  Lungs: CTAB  Abd: soft, nontender, nondistended, fundus firm  : no bleeding on pad    A/P: Patient is PPD#2 from  c/b chorioamnionitis with chest pain since this AM.  VSS.  Pain unchanged since AM.  Pain likely 2/2 to heartburn vs MSK soreness  - Recently received Motrin and Simethicone from RN, reports minimal improvement with meds  - Recommend Pepcid  - EKG to evaluate chest pain    d/w Dr. Milly Abebe PGY1    Update 10:58pm  - EKG reviewed by Cardiology NP Mabel, no acute ischemia noted on EKG Patient evaluated due to call from RN that patient with complaint of chest pain.  Per patient, chest pain started this AM, but she thought it would pass with time so did not alert RN.  Patient reports pain is "crampy" and is located midepigastrically and radiates under bilateral breasts, down to her upper abdomen and up towards her throat.  Reports pain is worse with sitting up, and is relieved with ambulation.  Denies shortness of breath but endorses some discomfort with deep breaths under bilateral breasts and states it feels irritated. Has been ambulating, passing flatus, tolerating regular diet, voiding.  Denies lightheadedness or dizziness, nausea or vomiting.  No pain in lower extremities    Vital Signs Last 24 Hrs  T(C): 37.1 (06 May 2023 21:44), Max: 37.1 (06 May 2023 21:44)  T(F): 98.7 (06 May 2023 21:44), Max: 98.7 (06 May 2023 21:44)  HR: 63 (06 May 2023 21:44) (63 - 74)  BP: 120/70 (06 May 2023 21:44) (120/70 - 123/79)  BP(mean): --  RR: 17 (06 May 2023 21:44) (17 - 18)  SpO2: 99% (06 May 2023 21:44) (99% - 100%)    Parameters below as of 06 May 2023 21:44  Patient On (Oxygen Delivery Method): room air    EXAM:  Gen: resting in bed comfortably in NAD, no labored breathing on RA  CV: RRR, pain minimally reproducible with palpation at epigastric region  Lungs: CTAB  Abd: soft, nontender, nondistended, fundus firm  : no bleeding on pad    A/P: Patient is PPD#2 from  c/b chorioamnionitis with chest pain since this AM.  VSS.  Pain unchanged since AM.  Pain likely 2/2 to heartburn vs MSK soreness  - Recently received Motrin and Simethicone from RN, reports minimal improvement with meds  - Recommend Pepcid  - EKG to evaluate chest pain    d/w Dr. Milly Abebe PGY1    Update 10:58pm  - EKG reviewed by Cardiology NP Mabel, no acute ischemia noted on EKG    Update 11:34pm  - Spoke with RN, on reevaluation after administration of Pepcid, patient states chest pain improving, feels well for discharge  - For discharge home    d/w Dr. Milly Abebe PGY1

## 2023-05-06 NOTE — PROVIDER CONTACT NOTE (OTHER) - SITUATION
MD notified and aware of pt c/o discomfort under breasts and near chest area. Discomfort doesn't worsen when taking a deep breath. Breath sounds clear and equal bilaterally.

## 2023-05-06 NOTE — PROGRESS NOTE ADULT - SUBJECTIVE AND OBJECTIVE BOX
OB Progress Note:  PPD#2    S: 28yo  PPD#2 s/p  1st degree laceration EBL 250ml c/b chorio s/p A/G/T.  Patient feels well. Pain is well controlled. She is tolerating a regular diet and passing flatus. She is voiding spontaneously, and ambulating without difficulty. Denies CP/SOB. Denies lightheadedness/dizziness. Denies N/V.    O:  Vitals:   Vital Signs Last 24 Hrs  T(C): 36.7 (06 May 2023 05:33), Max: 36.7 (06 May 2023 05:33)  T(F): 98 (06 May 2023 05:33), Max: 98 (06 May 2023 05:33)  HR: 67 (06 May 2023 05:33) (67 - 85)  BP: 123/79 (06 May 2023 05:33) (109/70 - 123/79)  BP(mean): --  RR: 18 (06 May 2023 05:33) (18 - 18)  SpO2: 100% (06 May 2023 05:33) (100% - 100%)    Parameters below as of 06 May 2023 05:33  Patient On (Oxygen Delivery Method): room air        MEDICATIONS  (STANDING):  acetaminophen     Tablet .. 975 milliGRAM(s) Oral <User Schedule>  diphtheria/tetanus/pertussis (acellular) Vaccine (Adacel) 0.5 milliLiter(s) IntraMuscular once  ibuprofen  Tablet. 600 milliGRAM(s) Oral every 6 hours  levothyroxine 50 MICROGram(s) Oral daily  prenatal multivitamin 1 Tablet(s) Oral daily  sodium chloride 0.9% lock flush 3 milliLiter(s) IV Push every 8 hours    MEDICATIONS  (PRN):  benzocaine 20%/menthol 0.5% Spray 1 Spray(s) Topical every 6 hours PRN for Perineal discomfort  dibucaine 1% Ointment 1 Application(s) Topical every 6 hours PRN Perineal discomfort  diphenhydrAMINE 25 milliGRAM(s) Oral every 6 hours PRN Pruritus  hydrocortisone 1% Cream 1 Application(s) Topical every 6 hours PRN Moderate Pain (4-6)  lanolin Ointment 1 Application(s) Topical every 6 hours PRN nipple soreness  magnesium hydroxide Suspension 30 milliLiter(s) Oral two times a day PRN Constipation  oxyCODONE    IR 5 milliGRAM(s) Oral every 3 hours PRN Moderate to Severe Pain (4-10)  oxyCODONE    IR 5 milliGRAM(s) Oral once PRN Moderate to Severe Pain (4-10)  pramoxine 1%/zinc 5% Cream 1 Application(s) Topical every 4 hours PRN Moderate Pain (4-6)  simethicone 80 milliGRAM(s) Chew every 4 hours PRN Gas  witch hazel Pads 1 Application(s) Topical every 4 hours PRN Perineal discomfort      Labs:  Blood type: A Positive  Rubella IgG: RPR: Negative                          9.4<L>   20.36<H> >-----------< 231    (  @ 05:50 )             27.8<L>                        12.0   18.43<H> >-----------< 248    (  @ 12:23 )             36.2                        12.1   13.10<H> >-----------< 243    (  @ 15:30 )             35.7        Physical Exam:  General: NAD  Abdomen: soft, non-tender, non-distended, fundus firm  Vaginal: Lochia wnl  Extremities: No erythema/edema, DP 2+ palpable    A/P: 28yo  PPD#2 s/p  c/b chorio s/p abx.  Patient is stable and doing well post-partum.        Chorio   - s/p A/G/T  - AFVSS overnight   - f/u bld cx no growth prelim and UCx-<10K  - Leukocytosis 20.36<---18.43  - no uterine tenderness noted  - will discuss with covering attending     Routine PP Care  - Pain well controlled, continue current pain regimen  - Increase ambulation, SCDs when not ambulating  - Continue regular diet  - Reviewed PEC precaution in detail pt verbalized understanding  - Discharge planning     GIULIANA Zaman-BC

## 2023-05-06 NOTE — PROVIDER CONTACT NOTE (OTHER) - RECOMMENDATIONS
Continue to monitor pt for any changes. give meds below as ordered, f/u with MD with how pt feels following med administration

## 2023-05-09 PROBLEM — E03.9 HYPOTHYROIDISM, UNSPECIFIED: Chronic | Status: ACTIVE | Noted: 2023-05-03

## 2023-05-09 LAB
CULTURE RESULTS: SIGNIFICANT CHANGE UP
CULTURE RESULTS: SIGNIFICANT CHANGE UP
SPECIMEN SOURCE: SIGNIFICANT CHANGE UP
SPECIMEN SOURCE: SIGNIFICANT CHANGE UP

## 2023-06-14 ENCOUNTER — APPOINTMENT (OUTPATIENT)
Dept: OBGYN | Facility: CLINIC | Age: 28
End: 2023-06-14
Payer: MEDICAID

## 2023-06-14 VITALS
HEIGHT: 62 IN | BODY MASS INDEX: 23 KG/M2 | DIASTOLIC BLOOD PRESSURE: 74 MMHG | SYSTOLIC BLOOD PRESSURE: 105 MMHG | WEIGHT: 125 LBS

## 2023-06-14 PROCEDURE — 0503F POSTPARTUM CARE VISIT: CPT

## 2023-07-05 NOTE — HISTORY OF PRESENT ILLNESS
[Delivery Date: ___] : on [unfilled] [] : delivered by vaginal delivery [Male] : Delivery History: baby boy [Wt. ___] : weighing [unfilled] [Breastfeeding] : currently nursing [Back to Normal] : is back to normal in size [None] : no vaginal bleeding [Normal] : the vagina was normal [Examination Of The Breasts] : breasts are normal [Doing Well] : is doing well [No Sign of Infection] : is showing no signs of infection [Excellent Pain Control] : has excellent pain control [BF with Difficulty] : nursing without difficulty [FreeTextEntry8] : post partum visit [FreeTextEntry9] : uncomplicated  [de-identified] : 41 weeks  [FreeTextEntry1] : 28 y/o F presenting for 6 week PP visit\par -normal PP exam\par -Patient cleared to resume intercourse and exercise\par -Patient counseled on contraception options, desires withdrawal method \par -f/u for 3 months for annual\par

## 2023-08-09 ENCOUNTER — APPOINTMENT (OUTPATIENT)
Dept: OBGYN | Facility: CLINIC | Age: 28
End: 2023-08-09

## 2023-09-07 ENCOUNTER — APPOINTMENT (OUTPATIENT)
Dept: OBGYN | Facility: CLINIC | Age: 28
End: 2023-09-07

## 2024-01-17 ENCOUNTER — APPOINTMENT (OUTPATIENT)
Dept: OBGYN | Facility: CLINIC | Age: 29
End: 2024-01-17

## 2024-02-07 ENCOUNTER — APPOINTMENT (OUTPATIENT)
Dept: OBGYN | Facility: CLINIC | Age: 29
End: 2024-02-07
Payer: COMMERCIAL

## 2024-02-07 VITALS — SYSTOLIC BLOOD PRESSURE: 89 MMHG | WEIGHT: 126 LBS | DIASTOLIC BLOOD PRESSURE: 60 MMHG | BODY MASS INDEX: 23.05 KG/M2

## 2024-02-07 DIAGNOSIS — Z3A.24 24 WEEKS GESTATION OF PREGNANCY: ICD-10-CM

## 2024-02-07 DIAGNOSIS — Z33.1 PREGNANT STATE, INCIDENTAL: ICD-10-CM

## 2024-02-07 DIAGNOSIS — N92.6 IRREGULAR MENSTRUATION, UNSPECIFIED: ICD-10-CM

## 2024-02-07 PROCEDURE — 99213 OFFICE O/P EST LOW 20 MIN: CPT

## 2024-02-08 DIAGNOSIS — E03.9 HYPOTHYROIDISM, UNSPECIFIED: ICD-10-CM

## 2024-02-08 LAB
ALBUMIN SERPL ELPH-MCNC: 4.9 G/DL
ALP BLD-CCNC: 71 U/L
ALT SERPL-CCNC: 20 U/L
ANION GAP SERPL CALC-SCNC: 9 MMOL/L
AST SERPL-CCNC: 25 U/L
BILIRUB SERPL-MCNC: 0.4 MG/DL
BUN SERPL-MCNC: 13 MG/DL
CALCIUM SERPL-MCNC: 9.1 MG/DL
CHLORIDE SERPL-SCNC: 105 MMOL/L
CO2 SERPL-SCNC: 27 MMOL/L
CREAT SERPL-MCNC: 0.56 MG/DL
EGFR: 128 ML/MIN/1.73M2
ESTIMATED AVERAGE GLUCOSE: 108 MG/DL
FSH SERPL-MCNC: 3.9 IU/L
GLUCOSE SERPL-MCNC: 73 MG/DL
HBA1C MFR BLD HPLC: 5.4 %
HCG SERPL-MCNC: <1 MIU/ML
LH SERPL-ACNC: 6.3 IU/L
POTASSIUM SERPL-SCNC: 4.4 MMOL/L
PROLACTIN SERPL-MCNC: 51.7 NG/ML
PROT SERPL-MCNC: 7.3 G/DL
SODIUM SERPL-SCNC: 142 MMOL/L
T4 FREE SERPL-MCNC: 0.2 NG/DL
T4 SERPL-MCNC: 1.6 UG/DL
TESTOST SERPL-MCNC: 12 NG/DL
TSH SERPL-ACNC: 117 UIU/ML

## 2024-02-08 RX ORDER — LEVOTHYROXINE SODIUM 0.03 MG/1
25 TABLET ORAL DAILY
Qty: 60 | Refills: 2 | Status: ACTIVE | COMMUNITY
Start: 2024-02-08 | End: 1900-01-01

## 2024-02-11 NOTE — PLAN
[FreeTextEntry1] : 29 yo female with irregular menses: -f/u aub panel, and hcg -discussed that breast feeding can lead to irregular menses. Also discussed many factors can affect menses such as weight gain/loss, starting/stopping exercise, stress, and sickness. -Patient to continue to monitor menses and f/u gyn Sono if menses continues to be irregular

## 2024-02-11 NOTE — HISTORY OF PRESENT ILLNESS
[FreeTextEntry1] : 29 yo female presents today stating she had an lmp of 1/1/24, which was her first period since her delivery 9 months ago. She states that she did not get a period this month. She reports that she is currently still breastfeeding. She also c/o leg cramps. She is due for her annual visit, but declines pap smear at this visit.

## 2024-02-11 NOTE — PHYSICAL EXAM
[Alert] : alert [Appropriately responsive] : appropriately responsive [No Acute Distress] : no acute distress [Soft] : soft [Non-tender] : non-tender [No HSM] : No HSM [Non-distended] : non-distended [No Mass] : no mass [No Lesions] : no lesions [Oriented x3] : oriented x3

## 2024-02-20 ENCOUNTER — APPOINTMENT (OUTPATIENT)
Dept: OBGYN | Facility: CLINIC | Age: 29
End: 2024-02-20

## 2024-05-02 ENCOUNTER — APPOINTMENT (OUTPATIENT)
Dept: OBGYN | Facility: CLINIC | Age: 29
End: 2024-05-02
Payer: COMMERCIAL

## 2024-05-02 DIAGNOSIS — Z01.419 ENCOUNTER FOR GYNECOLOGICAL EXAMINATION (GENERAL) (ROUTINE) W/OUT ABNORMAL FINDINGS: ICD-10-CM

## 2024-05-02 PROCEDURE — 99395 PREV VISIT EST AGE 18-39: CPT

## 2024-05-02 RX ORDER — ASCORBIC ACID, CHOLECALCIFEROL, .ALPHA.-TOCOPHEROL ACETATE, DL-, PYRIDOXINE, FOLIC ACID, CYANOCOBALAMIN, CALCIUM, FERROUS FUMARATE, MAGNESIUM, DOCONEXENT 85; 200; 10; 25; 1; 12; 140; 27; 45; 300 [IU]/1; [IU]/1; [IU]/1; [IU]/1; MG/1; UG/1; MG/1; MG/1; MG/1; MG/1
27-0.6-0.4-3 CAPSULE, GELATIN COATED ORAL
Qty: 90 | Refills: 3 | Status: ACTIVE | COMMUNITY
Start: 2024-05-02 | End: 1900-01-01

## 2024-05-05 LAB
C TRACH RRNA SPEC QL NAA+PROBE: NOT DETECTED
N GONORRHOEA RRNA SPEC QL NAA+PROBE: NOT DETECTED
SOURCE AMPLIFICATION: NORMAL

## 2024-05-06 ENCOUNTER — APPOINTMENT (OUTPATIENT)
Dept: OBGYN | Facility: CLINIC | Age: 29
End: 2024-05-06
Payer: COMMERCIAL

## 2024-05-06 DIAGNOSIS — Z00.00 ENCOUNTER FOR GENERAL ADULT MEDICAL EXAMINATION W/OUT ABNORMAL FINDINGS: ICD-10-CM

## 2024-05-06 PROCEDURE — 36415 COLL VENOUS BLD VENIPUNCTURE: CPT

## 2024-05-06 NOTE — PLAN
[FreeTextEntry1] : 29 y/o female presenting for annual exam with amenorrhea  -no +gestational sac seen today -f/u HCG drawn today and will repeat on Monday. if HCG rises appropriately pt to RTO 2 weeks  -f/u PAP and GC/CT done today -f/u prenatal blood work drawn today -Rx sent for PNV -RTO 2 weeks for viability ultrasound and review of prenatal lab results

## 2024-05-06 NOTE — HISTORY OF PRESENT ILLNESS
[N] : Patient is not sexually active [Y] : Positive pregnancy history [TextBox_102] : irregular menses  [LMPDate] : 3/22/24 [PGxTotal] : 1 [[facility]xFulerm] : 1 [[facility]] : 1 [FreeTextEntry1] :  x1

## 2024-05-07 LAB — HCG SERPL-MCNC: 1238 MIU/ML

## 2024-05-08 LAB
HCG SERPL-MCNC: 5465 MIU/ML
TSH SERPL-ACNC: 18.6 UIU/ML

## 2024-05-08 RX ORDER — LEVOTHYROXINE SODIUM 0.07 MG/1
75 TABLET ORAL
Qty: 15 | Refills: 3 | Status: ACTIVE | COMMUNITY
Start: 2024-05-08 | End: 1900-01-01

## 2024-05-08 RX ORDER — LEVOTHYROXINE SODIUM 0.05 MG/1
50 TABLET ORAL
Qty: 15 | Refills: 3 | Status: ACTIVE | COMMUNITY
Start: 2023-01-12 | End: 1900-01-01

## 2024-05-11 LAB
ABO + RH PNL BLD: NORMAL
APPEARANCE: CLEAR
BACTERIA UR CULT: NORMAL
BACTERIA: NEGATIVE /HPF
BASOPHILS # BLD AUTO: 0.06 K/UL
BASOPHILS NFR BLD AUTO: 0.8 %
BILIRUBIN URINE: NEGATIVE
BLD GP AB SCN SERPL QL: NORMAL
BLOOD URINE: NEGATIVE
CAST: 0 /LPF
COLOR: YELLOW
CYTOLOGY CVX/VAG DOC THIN PREP: NORMAL
EOSINOPHIL # BLD AUTO: 0.16 K/UL
EOSINOPHIL NFR BLD AUTO: 2 %
EPITHELIAL CELLS: 2 /HPF
ESTIMATED AVERAGE GLUCOSE: 105 MG/DL
GLUCOSE QUALITATIVE U: NEGATIVE MG/DL
GLUCOSE SERPL-MCNC: 83 MG/DL
HBA1C MFR BLD HPLC: 5.3 %
HBV SURFACE AG SER QL: NONREACTIVE
HCT VFR BLD CALC: 37.2 %
HCV AB SER QL: NONREACTIVE
HCV S/CO RATIO: 0.12 S/CO
HGB BLD-MCNC: 12.3 G/DL
HIV1+2 AB SPEC QL IA.RAPID: NONREACTIVE
IMM GRANULOCYTES NFR BLD AUTO: 0.4 %
KETONES URINE: NEGATIVE MG/DL
LEAD BLD-MCNC: <1 UG/DL
LEUKOCYTE ESTERASE URINE: NEGATIVE
LYMPHOCYTES # BLD AUTO: 2.41 K/UL
LYMPHOCYTES NFR BLD AUTO: 30.1 %
M TB IFN-G BLD-IMP: NEGATIVE
MAN DIFF?: NORMAL
MCHC RBC-ENTMCNC: 30.6 PG
MCHC RBC-ENTMCNC: 33.1 GM/DL
MCV RBC AUTO: 92.5 FL
MEV IGG FLD QL IA: 73.3 AU/ML
MEV IGG+IGM SER-IMP: POSITIVE
MICROSCOPIC-UA: NORMAL
MONOCYTES # BLD AUTO: 0.45 K/UL
MONOCYTES NFR BLD AUTO: 5.6 %
MUV AB SER-ACNC: POSITIVE
MUV IGG SER QL IA: 53.9 AU/ML
NEUTROPHILS # BLD AUTO: 4.89 K/UL
NEUTROPHILS NFR BLD AUTO: 61.1 %
NITRITE URINE: NEGATIVE
PH URINE: 6
PLATELET # BLD AUTO: 285 K/UL
PROTEIN URINE: NEGATIVE MG/DL
QUANTIFERON TB PLUS MITOGEN MINUS NIL: >10 IU/ML
QUANTIFERON TB PLUS NIL: 0.06 IU/ML
QUANTIFERON TB PLUS TB1 MINUS NIL: 0 IU/ML
QUANTIFERON TB PLUS TB2 MINUS NIL: 0 IU/ML
RBC # BLD: 4.02 M/UL
RBC # FLD: 13.3 %
RED BLOOD CELLS URINE: 1 /HPF
RUBV IGG FLD-ACNC: 29.9 INDEX
RUBV IGG SER-IMP: POSITIVE
SPECIFIC GRAVITY URINE: 1.01
T PALLIDUM AB SER QL IA: NEGATIVE
T4 FREE SERPL-MCNC: 1.2 NG/DL
UROBILINOGEN URINE: 0.2 MG/DL
WBC # FLD AUTO: 8 K/UL
WHITE BLOOD CELLS URINE: 1 /HPF

## 2024-05-14 ENCOUNTER — NON-APPOINTMENT (OUTPATIENT)
Age: 29
End: 2024-05-14

## 2024-05-15 ENCOUNTER — ASOB RESULT (OUTPATIENT)
Age: 29
End: 2024-05-15

## 2024-05-15 ENCOUNTER — APPOINTMENT (OUTPATIENT)
Dept: ANTEPARTUM | Facility: CLINIC | Age: 29
End: 2024-05-15

## 2024-05-15 ENCOUNTER — APPOINTMENT (OUTPATIENT)
Dept: OBGYN | Facility: CLINIC | Age: 29
End: 2024-05-15
Payer: COMMERCIAL

## 2024-05-15 ENCOUNTER — NON-APPOINTMENT (OUTPATIENT)
Age: 29
End: 2024-05-15

## 2024-05-15 VITALS — BODY MASS INDEX: 23.05 KG/M2 | SYSTOLIC BLOOD PRESSURE: 100 MMHG | WEIGHT: 126 LBS | DIASTOLIC BLOOD PRESSURE: 54 MMHG

## 2024-05-15 PROCEDURE — 76815 OB US LIMITED FETUS(S): CPT

## 2024-05-15 PROCEDURE — 0502F SUBSEQUENT PRENATAL CARE: CPT

## 2024-05-15 RX ORDER — LEVOTHYROXINE SODIUM 0.07 MG/1
75 TABLET ORAL DAILY
Qty: 30 | Refills: 2 | Status: ACTIVE | COMMUNITY
Start: 2024-05-15 | End: 1900-01-01

## 2024-05-17 LAB
T4 FREE SERPL-MCNC: 1 NG/DL
TSH SERPL-ACNC: 10.5 UIU/ML

## 2024-05-17 RX ORDER — LEVOTHYROXINE SODIUM 0.09 MG/1
88 TABLET ORAL DAILY
Qty: 30 | Refills: 0 | Status: ACTIVE | COMMUNITY
Start: 2024-05-17 | End: 1900-01-01

## 2024-05-20 LAB
3-BETA-HYDROXYSTEROID DEHYDROGENASE II: NEGATIVE
3-HYDROXY-3-METHYLGLUTARYL-COA LYASE DEF: NEGATIVE
3-METHYLCROTONYL-COA CARBOXYLASE 1 DEFIC: NEGATIVE
3-METHYLCROTONYL-COA CARBOXYLASE 2 DEFIC: NEGATIVE
3-PHOSPHOGLYCERATE DEHYDROGENASE DEFICIE: NEGATIVE
6-PYRUVOYL-TETRAHYDROPTERIN SYNTHASE: NEGATIVE
ABETALIPOPROTEINEMIA: NEGATIVE
ACHONDROGENESIS, TYPE 1B: NEGATIVE
ACHROMATOPSIA: NEGATIVE
ACRODERMATITIS ENTEROPATHICA: NEGATIVE
ACUTE INFANTILE LIVER FAILURE: NEGATIVE
ACYL-COA OXIDASE I DEFICIENCY: NEGATIVE
ADRENOLEUKODYSTROPHY: NEGATIVE
AICARDI-GOUTIÈRES SYNDROME: NEGATIVE
ALPHA THALASSEMIA MENTAL RETARDATION SYN: NEGATIVE
ALPHA-MANNOSIDOSIS: NEGATIVE
ALPHA-THALASSEMIA: NEGATIVE
ALPORT SYNDROME, COL4A3-RELATED: NEGATIVE
ALPORT SYNDROME, COL4A4-RELATED: NEGATIVE
ALPORT SYNDROME, X-LINKED: NEGATIVE
ALSTROM SYNDROME: NEGATIVE
ANDERMANN SYNDROME: NEGATIVE
ARGININOSUCCINATE LYASE DEFICIENCY: NEGATIVE
AROMATASE DEFICIENCY: NEGATIVE
ASPARAGINE SYNTHETASE DEFICIENCY: NEGATIVE
ASPARTYLGLYCOSAMINURIA: NEGATIVE
ATAXIA WITH VITAMIN E DEFICIENCY: NEGATIVE
ATAXIA-TELANGIECTASIA: NEGATIVE
AUTISM SPECTRUM, EPILEPSY AND ARTHROGRYP: NEGATIVE
AUTOSOMAL RECESSIVE SPASTIC ATAXIA OF CH: NEGATIVE
BARDET-BIEDL SYNDROME, BBS1-RELATED: NEGATIVE
BARDET-BIEDL SYNDROME, BBS10-RELATED: NEGATIVE
BARDET-BIEDL SYNDROME, BBS12-RELATED: NEGATIVE
BARDET-BIEDL SYNDROME, BBS2-RELATED: NEGATIVE
BARE LYMPHOCYTE SYNDROME, TYPE II: NEGATIVE
BARTTER SYNDROME: NEGATIVE
BATTEN DISEASE: NEGATIVE
BETA-HEMOGLOBINOPATHIES: NEGATIVE
BILATERAL FRONTOPARIETAL POLYMICROGYRIA: NEGATIVE
BIOTINIDASE DEFICIENCY: NEGATIVE
BLOOM SYNDROME: NEGATIVE
CANAVAN DISEASE: NEGATIVE
CARBAMOYL PHOSPHATE SYNTHETASE I DEF: NEGATIVE
CARNITINE DEFICIENCY: NEGATIVE
CARNITINE PALMITOYLTRANSFERASE IA DEF: NEGATIVE
CARNITINE PALMITOYLTRANSFERASE II DEF: NEGATIVE
CARPENTER SYNDROME: NEGATIVE
CARTILAGE-HAIR HYPOPLASIA: NEGATIVE
CEREBROTENDINOUS XANTHOMATOSIS: NEGATIVE
CFTR MUT ANL BLD/T: NEGATIVE
CHARCOT-MARIE-TOOTH DISEASE WITH DEAFNES: NEGATIVE
CHARCOT-MARIE-TOOTH DISEASE, TYPE 4D: NEGATIVE
CHOREOACANTHOCYTOSIS: NEGATIVE
CHOROIDEREMIA: NEGATIVE
CHRONIC GRANULOMATOUS DISEASE, CYTOCHROM: NEGATIVE
CHRONIC GRANULOMATOUS DISEASE, X-LINKED: NEGATIVE
CILIOPATHIES, RPGRIP1L-RELATED: NEGATIVE
CITRIN DEFICIENCY: NEGATIVE
CITRULLINEMIA, TYPE I: NEGATIVE
COHEN SYNDROME: NEGATIVE
COMBINED MALONIC AND METHYLMALONIC ACIDU: NEGATIVE
COMBINED OXIDATIVE PHOSPHORYLATION DEF 3: NEGATIVE
COMBINED OXIDATIVE PHOSPHORYLATION DEF 4: NEGATIVE
COMBINED PITUITARY HORMONE DEFICIENCY-2: NEGATIVE
CONGENITAL ADRENAL HYPERPLASIA, 17-ALPHA: NEGATIVE
CONGENITAL AMEGAKARYOCYTIC THROMBOCYTOPE: NEGATIVE
CONGENITAL DISORDER OF GLYCOSYLATION 1C: NEGATIVE
CONGENITAL DISORDER OF GLYCOSYLATION IA: NEGATIVE
CONGENITAL DISORDER OF GLYCOSYLATION IB: NEGATIVE
CONGENITAL FINNISH NEPHROSIS: NEGATIVE
CONGENITAL INSENSIVITY TO PAIN WITH ANHI: NEGATIVE
CONGENITAL MYASTHENIC SYNDROME, CHRNE: NEGATIVE
CONGENITAL MYASTHENIC SYNDROME, RAPSN-RE: NEGATIVE
CONGENITAL NEUTROPENIA, HAX1-RELATED: NEGATIVE
CONGENITAL NEUTROPENIA, VPS45-RELATED: NEGATIVE
CORNEAL DYSTROPHY AND PERCEPTIVE DEAFNES: NEGATIVE
CORTICOSTERONE METHYLOXIDASE DEFICIENCY: NEGATIVE
COSTEFF DISEASE OPTIC ATROPHY: NEGATIVE
CRB1-RELATED RETINAL DYSTROPHIES: NEGATIVE
CREATINE TRANSPORTER DEFECT: NEGATIVE
CYSTINOSIS: NEGATIVE
D-BIFUNCTIONAL PROTEIN DEFICIENCY: NEGATIVE
DEAFNESS, AUTOSOMAL RECESSIVE 77: NEGATIVE
DMD GENE MUT ANL BLD/T: NEGATIVE
DYSKERATOSIS CONGENITA: NEGATIVE
DYSTROPHIC EPIDERMOLYSIS BULLOSA: NEGATIVE
EHLERS-DANLOS SYNDROME, TYPE VIIC: NEGATIVE
ELLIS-VAN CREVELD SYNDOME: NEGATIVE
EMERY-DREIFUSS MUSCULAR DYSTROPHY 1 X-LI: NEGATIVE
ENHANCED S-CONE SYNDROME: NEGATIVE
ETHYLMALONIC ENCEPHALOPATHY: NEGATIVE
FABRY DISEASE: NEGATIVE
FACTOR IX DEFICIENCY: NEGATIVE
FACTOR XI DEFICIENCY: NEGATIVE
FAMILIAL DYSAUTONOMIA: NEGATIVE
FAMILIAL HYPERCHOLESTEROLEMIA, LDLR: NEGATIVE
FAMILIAL HYPERCHOLESTEROLEMIA, LDLRAP1: NEGATIVE
FAMILIAL HYPERINSULINISM: NEGATIVE
FAMILIAL MEDITERRANEAN FEVER: NEGATIVE
FAMILIAL NEUROPOPHYSEAL DIABETES INSIPID: NEGATIVE
FANCONI ANEMIA, GROUP A: NEGATIVE
FANCONI ANEMIA, GROUP C: NEGATIVE
FANCONI ANEMIA, GROUP G: NEGATIVE
FRAGILE X PROTEIN (FMRP) BLD-IMP: NEGATIVE
FUMARASE DEFICIENCY: NEGATIVE
GALACTOKINASE DEFICIENCY: NEGATIVE
GALACTOSEMIA: NEGATIVE
GAUCHER DISEASE: NEGATIVE
GITELMAN SYNDROME: NEGATIVE
GLUTARIC ACIDEMIA, TYPE 2A: NEGATIVE
GLUTARIC ACIDEMIA, TYPE 2C: NEGATIVE
GLUTARYL-COA DEHYDROGENASE DEFICIENCY: NEGATIVE
GLYCINE ENCEPHALOPATHY, AMT-RELATED: NEGATIVE
GLYCINE ENCEPHALOPATHY: NEGATIVE
GLYCOGEN STORAGE DISEASE, TYPE 1A: NEGATIVE
GLYCOGEN STORAGE DISEASE, TYPE 2: NEGATIVE
GLYCOGEN STORAGE DISEASE, TYPE 3: NEGATIVE
GLYCOGEN STORAGE DISEASE, TYPE 4: NEGATIVE
GLYCOGEN STORAGE DISEASE, TYPE 5: NEGATIVE
GLYCOGEN STORAGE DISEASE, TYPE IB: NEGATIVE
GLYCOGEN STORAGE DISEASE, TYPE VII: NEGATIVE
GRACILE SYNDROME: NEGATIVE
GUANIDINOACETATE METHYLTRANSFERASE DEFIC: NEGATIVE
HEMOCHROMATOSIS, TYPE 2A: NEGATIVE
HEMOCHROMATOSIS, TYPE 3, TFR2-RELATED: NEGATIVE
HEREDITARY FRUCTOSE INTOLERANCE: NEGATIVE
HEREDITARY SPASTIC PARAPARESIS, TYPE 49: NEGATIVE
HERMANSKY-PUDLAK SYNDROME, HPS1-RELATED: NEGATIVE
HERMANSKY-PUDLAK SYNDROME, HPS3-RELATED: NEGATIVE
HEXOSAMINIDASE A & B BLD-IMP: NEGATIVE
HOLOCARBOXYLASE SYNTHETASE DEFICIENCY: NEGATIVE
HOMOCYSTINURIA DUE TO DEFIC OF MTHFR: NEGATIVE
HOMOCYSTINURIA, TYPE CBLE: NEGATIVE
HOMOCYSTINURIA: NEGATIVE
HYDROLETHALUS SYNDROME: NEGATIVE
HYPERINSULINEMIC HYPOGLYCEMIA: NEGATIVE
HYPERORNITHINEMIA-HYPERAMMONEMIA-HOMOCIT: NEGATIVE
HYPOHIDROTIC ECTODERMAL DYSPLASIA X-LINK: NEGATIVE
HYPOPHOSPHATASIA, AUTOSOMAL RECESSIVE: NEGATIVE
INCLUSION BODY MYOPATHY 2: NEGATIVE
INFANTILE CEREBRAL AND CEREBELLAR ATROPH: NEGATIVE
ISOVALERIC ACIDEMIA: NEGATIVE
JOUBERT SYNDROME 2: NEGATIVE
KETOTHIOLASE DEFICIENCY: NEGATIVE
KRABBE DISEASE: NEGATIVE
LAMELLAR ICHTHYOSIS, TYPE 1: NEGATIVE
LEBER CONGENITAL AMAUROSIS 2: NEGATIVE
LEBER CONGENITAL AMAUROSIS, TYPE CEP290: NEGATIVE
LEBER CONGENITAL AMAUROSIS, TYPE LCA5: NEGATIVE
LEBER CONGENITAL AMAUROSIS, TYPE RDH12: NEGATIVE
LEIGH SYNDROME, FRENCH-CANADIAN TYPE: NEGATIVE
LETHAL CONGENITAL CONTRACTURE SYNDROME 1: NEGATIVE
LEUKOENCEPHALOPATHY WITH VANISHING WHITE: NEGATIVE
LIMB GIRDLE MUSCULAR DYSTROPHY, TYPE 2A: NEGATIVE
LIMB GIRDLE MUSCULAR DYSTROPHY, TYPE 2B: NEGATIVE
LIMB GIRDLE MUSCULAR DYSTROPHY, TYPE 2I: NEGATIVE
LIMB-GIRDLE MUSCULAR DYSTROPHY, TYPE 2C: NEGATIVE
LIMB-GIRDLE MUSCULAR DYSTROPHY, TYPE 2D: NEGATIVE
LIMB-GIRDLE MUSCULAR DYSTROPHY, TYPE 2E: NEGATIVE
LIPOAMIDE DEHYDROGENASE DEFICIENCY: NEGATIVE
LIPOID ADRENAL HYPERPLASIA: NEGATIVE
LIPOPROTEIN LIPASE DEFICIENCY: NEGATIVE
LONG CHAIN 3-HYDROXYACYL-COA DEHYDROGENA: NEGATIVE
LYSINURIC PROTEIN INTOLERANCE: NEGATIVE
MAPLE SYRUP URINE DISEASE, TYPE 1A: NEGATIVE
MAPLE SYRUP URINE DISEASE, TYPE 1B: NEGATIVE
MECKEL-GRUBER SYNDROME, TYPE 1: NEGATIVE
MEDIUM CHAIN ACYL-COA DEHYDROGENASE DEFICIENCY: NEGATIVE
MEGALENCEPHALIC LEUKOENCEPHALOPATHY: NEGATIVE
METACHROMATIC LEUKODYSTROPHY GAUCHER: NEGATIVE
METACHROMATIC LEUKODYSTROPHY, ARSA: NEGATIVE
METHYLMALONIC ACIDURIA AND HOMOCYST CBIC: NEGATIVE
METHYLMALONIC ACIDURIA AND HOMOCYST CBID: NEGATIVE
METHYLMALONIC ACIDURIA, MMAA-RELATED: NEGATIVE
METHYLMALONIC ACIDURIA, MMAB-RELATED: NEGATIVE
METHYLMALONIC ACIDURIA, TYPE MUT(0): NEGATIVE
MICROPHTHALMIA/ANOPHTHALMIA: NEGATIVE
MITOCHONDRIAL COMPLEX 1 DEFIC NDUFAF5: NEGATIVE
MITOCHONDRIAL COMPLEX 1 DEFIC NDUFS6: NEGATIVE
MITOCHONDRIAL DNA DEPLETION SYNDROME 6: NEGATIVE
MITOCHONDRIAL MYOPATHY AND SIDEROBLASTIC: NEGATIVE
MUCOLIPIDOSIS II/IIIA: NEGATIVE
MUCOLIPIDOSIS III GAMMA: NEGATIVE
MUCOLIPIDOSIS, TYPE IV: NEGATIVE
MUCOPOLYSACCHARIDOSIS, TYPE I: NEGATIVE
MUCOPOLYSACCHARIDOSIS, TYPE II: NEGATIVE
MUCOPOLYSACCHARIDOSIS, TYPE IIIA: NEGATIVE
MUCOPOLYSACCHARIDOSIS, TYPE IIIB: NEGATIVE
MUCOPOLYSACCHARIDOSIS, TYPE IIIC: NEGATIVE
MUCOPOLYSACCHARIDOSIS, TYPE IIID: NEGATIVE
MUCOPOLYSACCHARIDOSIS, TYPE IVB: NEGATIVE
MUCOPOLYSACCHARIDOSIS, TYPE IX: NEGATIVE
MUCOPOLYSACCHARIDOSIS, TYPE VI: NEGATIVE
MULTIPLE SULPHATASE DEFICIENCY: NEGATIVE
MUSCLE-EYE-BRAIN DISEASE, POMGNT1-RELATE: NEGATIVE
MYONEUROGASTROINTESTINAL ENCEPHALOPATHY: NEGATIVE
MYOTUBULAR MYOPATHY, X-LINKED: NEGATIVE
N-ACETYLGLUTAMATE SYNTHASE DEFICIENCY: NEGATIVE
NEMALINE MYOPATHY: NEGATIVE
NEURONAL CEROID LIPOFUSCINOSIS, CLN5: NEGATIVE
NEURONAL CEROID LIPOFUSCINOSIS, MFSD8: NEGATIVE
NEURONAL CEROID LIPOFUSCINOSIS, PPT1-REL: NEGATIVE
NEURONAL CEROID LIPOFUSCINOSIS, TPP1-REL: NEGATIVE
NEURONAL CEROID-LIPOFUSCINOSIS, CLN6: NEGATIVE
NEURONAL CEROID-LIPOFUSCINOSIS, CLN8: NEGATIVE
NIEMANN PICK DISEASE, TYPE C1/D: NEGATIVE
NIEMANN PICK DISEASE, TYPE C2: NEGATIVE
NIEMANN-PICK DISEASE, TYPES A/B: NEGATIVE
NIJMEGEN BREAKAGE SYNDROME: NEGATIVE
NON-SYNDROMIC HEARING LOSS, GJB2-RELATED: NEGATIVE
OCCIPITAL HORN SYNDROME: NEGATIVE
ODONTO-ONYCHO-DERMAL DYSPLASIA: NEGATIVE
OMENN SYNDROME: NEGATIVE
ORNITHINE AMINOTRANSFERASE DEFICIENCY: NEGATIVE
ORNITHINE TRANSCARBAMYLASE DEFICIENCY: NEGATIVE
OSTEOPETROSIS, INFANTILE MALIGNANT: NEGATIVE
PANEL NOTES: NORMAL
PENDRED SYNDROME: NEGATIVE
PHENYLKETONURIA: NEGATIVE
PITUITARY HORMONE DEFICIENCY, COMBINED 3: NEGATIVE
POLYCYSTIC KIDNEY DISEASE, AUTOSOMAL RECESSIVE: NEGATIVE
POLYGLANDULAR AUTOIMMUNE SYNDROME: NEGATIVE
PONTOCEREBELLAR HYPOPLASIA, RARS2-RELATE: NEGATIVE
PONTOCEREBELLAR HYPOPLASIA, TYPE 1A: NEGATIVE
PRIMARY CILIARY DYSKINESIA DNAH5-RELATED: NEGATIVE
PRIMARY CILIARY DYSKINESIA DNAI1-RELATED: NEGATIVE
PRIMARY CILIARY DYSKINESIA DNAI2-RELATED: NEGATIVE
PRIMARY HYPEROXALURIA, TYPE 1: NEGATIVE
PRIMARY HYPEROXALURIA, TYPE 2: NEGATIVE
PRIMARY HYPEROXALURIA, TYPE 3: NEGATIVE
PROGRESSIVE CEREBELLO-CEREBRAL ATROPHY: NEGATIVE
PROGRESSIVE FAMILIAL INTRAHEPATIC CHOLES: NEGATIVE
PROPIONIC ACIDEMIA, ALPHA SUBUNIT: NEGATIVE
PROPIONIC ACIDEMIA, BETA SUBUNIT: NEGATIVE
PYCNODYSOSTOSIS: NEGATIVE
PYRUVATE DEHYDROGENASE DEFICIENCY AUTOSO: NEGATIVE
PYRUVATE DEHYDROGENASE DEFICIENCY X-LINK: NEGATIVE
RENAL TUBULAR ACIDOSIS AND DEAFNESS: NEGATIVE
REPRODUCTIVE CARRIER MULTIGENE ANL BLD/T: NEGATIVE
RETINITIS PIGMENTOSA 25: NEGATIVE
RETINITIS PIGMENTOSA 26: NEGATIVE
RETINITIS PIGMENTOSA 28: NEGATIVE
RETINITIS PIGMENTOSA 59: NEGATIVE
RHIZOMELIC CHONDRODYSPLASIA PUNCTATA 3: NEGATIVE
RHIZOMELIC CHONDRODYSPLASIA PUNCTATA I: NEGATIVE
RIBOFLAVIN RESPONSIVE COMPLEX 1 DEF: NEGATIVE
ROBERTS SYNDROME: NEGATIVE
RPT COMMENT: NORMAL
SALLA DISEASE: NEGATIVE
SANDHOFF DISEASE: NEGATIVE
SCHIMKE IMMUNOOSSEOUS DYSPLASIA: NEGATIVE
SEGAWA SYNDROME, AUTOSOMAL RECESSIVE: NEGATIVE
SEVERE COMBINED IMMUNODEFICIENCY, TYPE A: NEGATIVE
SEVERE COMBINED IMMUNODEFICIENCY: NEGATIVE
SJOGREN-LARSSON SYNDROME: NEGATIVE
SMITH-LEMLI-OPITZ SYNDROME: NEGATIVE
SMN1 GENE MUT ANL BLD/T: NEGATIVE
SPONDYLOTHORACIC DYSOSTOSIS: NEGATIVE
STEROID-RESISTANT NEPHROTIC SYNDROME: NEGATIVE
STUVE-WIEDEMANN SYNDROME: NEGATIVE
TEST PERFORMANCE INFO SPEC: NORMAL
TYROSINEMIA, TYPE I: NEGATIVE
USHER SYNDROME, TYPE 1B: NEGATIVE
USHER SYNDROME, TYPE 1C: NEGATIVE
USHER SYNDROME, TYPE 1D: NEGATIVE
USHER SYNDROME, TYPE 1F: NEGATIVE
USHER SYNDROME, TYPE 2A: NEGATIVE
USHER SYNDROME, TYPE 3: NEGATIVE
VERY LONG CHAIN ACYL-COA DEHYDROGENASE: NEGATIVE
WALKER-WARBURG SYNDROME: NEGATIVE
WILSON DISEASE: NEGATIVE
WOLMAN DISEASE: NEGATIVE
X-LINKED JUVENILE RETINOSCHISIS: NEGATIVE
X-LINKED SEVERE COMBINED IMMUNODEFICIENC: NEGATIVE
ZELLWEGER SPECTRUM DISORDERS, PEX1-RELAT: NEGATIVE
ZELLWEGER SPECTRUM DISORDERS, PEX10-RELA: NEGATIVE
ZELLWEGER SPECTRUM DISORDERS, PEX2-RELAT: NEGATIVE
ZELLWEGER SPECTRUM DISORDERS, PEX6-RELAT: NEGATIVE

## 2024-05-29 ENCOUNTER — APPOINTMENT (OUTPATIENT)
Dept: OBGYN | Facility: CLINIC | Age: 29
End: 2024-05-29
Payer: COMMERCIAL

## 2024-05-29 PROCEDURE — 0502F SUBSEQUENT PRENATAL CARE: CPT

## 2024-06-02 LAB
T3FREE SERPL-MCNC: 2.58 PG/ML
T4 FREE SERPL-MCNC: 1.2 NG/DL

## 2024-06-03 ENCOUNTER — NON-APPOINTMENT (OUTPATIENT)
Age: 29
End: 2024-06-03

## 2024-06-04 LAB — TSH SERPL-ACNC: 9.92 UIU/ML

## 2024-06-10 ENCOUNTER — APPOINTMENT (OUTPATIENT)
Dept: OBGYN | Facility: CLINIC | Age: 29
End: 2024-06-10
Payer: COMMERCIAL

## 2024-06-10 ENCOUNTER — LABORATORY RESULT (OUTPATIENT)
Age: 29
End: 2024-06-10

## 2024-06-10 VITALS — WEIGHT: 124 LBS | SYSTOLIC BLOOD PRESSURE: 97 MMHG | BODY MASS INDEX: 22.68 KG/M2 | DIASTOLIC BLOOD PRESSURE: 61 MMHG

## 2024-06-10 PROCEDURE — 0502F SUBSEQUENT PRENATAL CARE: CPT

## 2024-06-26 ENCOUNTER — APPOINTMENT (OUTPATIENT)
Dept: OBGYN | Facility: CLINIC | Age: 29
End: 2024-06-26
Payer: COMMERCIAL

## 2024-06-26 ENCOUNTER — ASOB RESULT (OUTPATIENT)
Age: 29
End: 2024-06-26

## 2024-06-26 ENCOUNTER — APPOINTMENT (OUTPATIENT)
Dept: ANTEPARTUM | Facility: CLINIC | Age: 29
End: 2024-06-26

## 2024-06-26 VITALS — SYSTOLIC BLOOD PRESSURE: 100 MMHG | DIASTOLIC BLOOD PRESSURE: 65 MMHG | WEIGHT: 123 LBS | BODY MASS INDEX: 22.5 KG/M2

## 2024-06-26 DIAGNOSIS — E06.3 AUTOIMMUNE THYROIDITIS: ICD-10-CM

## 2024-06-26 DIAGNOSIS — Z34.90 ENCOUNTER FOR SUPERVISION OF NORMAL PREGNANCY, UNSPECIFIED, UNSPECIFIED TRIMESTER: ICD-10-CM

## 2024-06-26 PROCEDURE — 76801 OB US < 14 WKS SINGLE FETUS: CPT

## 2024-06-26 PROCEDURE — 0502F SUBSEQUENT PRENATAL CARE: CPT

## 2024-06-26 PROCEDURE — 76813 OB US NUCHAL MEAS 1 GEST: CPT

## 2024-06-27 LAB
T4 FREE SERPL-MCNC: 1.2 NG/DL
TSH SERPL-ACNC: 2.88 UIU/ML

## 2024-06-29 LAB
ADDITIONAL US: NORMAL
CRL SCAN TWIN B: NORMAL
CRL SCAN: NORMAL
CROWN RUMP LENGTH TWIN B: NORMAL
CROWN RUMP LENGTH: 62.7 MM
DIA MOM: 1.16
DIA VALUE: 313.5 PG/ML
DOWN SYNDROME AGE RISK: NORMAL
DOWN SYNDROME INTERPRETATION: NORMAL
DOWN SYNDROME SCREENING RISK: NORMAL
FIRST TRIMESTER SCREEN COMMENTS: NORMAL
FIRST TRIMESTER SCREEN NOTE: NORMAL
FIRST TRIMESTER SCREEN RESULTS: NORMAL
FIRST TRIMESTER SCREEN TEST RESULTS: NORMAL
GEST. AGE ON COLLECTION DATE: 12.4 WEEKS
HCG MOM: 0.97
HCG VALUE: 97.5 IU/ML
MATERNAL AGE AT EDD: 28.2 YR
NT MOM TWIN B: NORMAL
NT TWIN B: NORMAL
NUCHAL TRANSLUCENCY (NT): 1.6 MM
NUCHAL TRANSLUCENCY MOM: 1.18
NUMBER OF FETUSES: 1
PAPP-A MOM: 0.93
PAPP-A VALUE: 1123.9 NG/ML
RACE: NORMAL
SONOGRAPHER ID#: NORMAL
TRISOMY 18 AGE RISK: NORMAL
TRISOMY 18 INTERPRETATION: NORMAL
TRISOMY 18 SCREENING RISK: NORMAL
WEIGHT AFP: 123 LBS

## 2024-07-08 ENCOUNTER — NON-APPOINTMENT (OUTPATIENT)
Age: 29
End: 2024-07-08

## 2024-07-09 ENCOUNTER — APPOINTMENT (OUTPATIENT)
Dept: OBGYN | Facility: CLINIC | Age: 29
End: 2024-07-09
Payer: COMMERCIAL

## 2024-07-09 VITALS — BODY MASS INDEX: 21.95 KG/M2 | DIASTOLIC BLOOD PRESSURE: 70 MMHG | WEIGHT: 120 LBS | SYSTOLIC BLOOD PRESSURE: 102 MMHG

## 2024-07-09 DIAGNOSIS — E06.3 AUTOIMMUNE THYROIDITIS: ICD-10-CM

## 2024-07-09 PROCEDURE — 0502F SUBSEQUENT PRENATAL CARE: CPT

## 2024-07-09 RX ORDER — LEVOTHYROXINE SODIUM 0.1 MG/1
100 TABLET ORAL DAILY
Qty: 30 | Refills: 4 | Status: ACTIVE | COMMUNITY
Start: 2024-07-09 | End: 1900-01-01

## 2024-07-09 RX ORDER — LEVOTHYROXINE SODIUM 0.09 MG/1
88 TABLET ORAL DAILY
Qty: 30 | Refills: 2 | Status: ACTIVE | COMMUNITY
Start: 2024-07-09 | End: 1900-01-01

## 2024-07-29 ENCOUNTER — APPOINTMENT (OUTPATIENT)
Dept: OBGYN | Facility: CLINIC | Age: 29
End: 2024-07-29
Payer: COMMERCIAL

## 2024-07-29 VITALS
DIASTOLIC BLOOD PRESSURE: 63 MMHG | HEIGHT: 62 IN | WEIGHT: 124 LBS | SYSTOLIC BLOOD PRESSURE: 96 MMHG | BODY MASS INDEX: 22.82 KG/M2

## 2024-07-29 PROCEDURE — 0502F SUBSEQUENT PRENATAL CARE: CPT

## 2024-07-31 LAB — TSH SERPL-ACNC: 6.87 UIU/ML

## 2024-07-31 RX ORDER — LEVOTHYROXINE SODIUM 0.1 MG/1
100 TABLET ORAL DAILY
Qty: 30 | Refills: 0 | Status: ACTIVE | COMMUNITY
Start: 2024-07-31 | End: 1900-01-01

## 2024-08-01 LAB
AF-AFP DISCLAIMER: NORMAL
AFP  MOM: 0.78
AFP CONCENTRATION: 32.64 NG/ML
AFP INTERPRETATION: NORMAL
AFP MOM CUT-OFF: 2.5
AFP PERCENTILE: 22.1
AFP SCREENING RESULT: NORMAL
AFTER SCREENING RISK OPEN SPINA BIFIDA: NORMAL
BEFORE SCREENING RISK OPEN SPINA BIFIDA: NORMAL
EXTREME ANALYTE ALERT: NO
GESTATIONAL  AGE: NORMAL
MATERNAL WGT: 124
RACE/ETHNICITY: NORMAL
T3FREE SERPL-MCNC: 2.22 PG/ML
T4 FREE SERPL-MCNC: 1.1 NG/DL

## 2024-08-21 ENCOUNTER — ASOB RESULT (OUTPATIENT)
Age: 29
End: 2024-08-21

## 2024-08-21 ENCOUNTER — APPOINTMENT (OUTPATIENT)
Dept: ANTEPARTUM | Facility: CLINIC | Age: 29
End: 2024-08-21
Payer: COMMERCIAL

## 2024-08-21 ENCOUNTER — APPOINTMENT (OUTPATIENT)
Dept: OBGYN | Facility: CLINIC | Age: 29
End: 2024-08-21
Payer: COMMERCIAL

## 2024-08-21 VITALS
BODY MASS INDEX: 23.19 KG/M2 | DIASTOLIC BLOOD PRESSURE: 71 MMHG | WEIGHT: 126 LBS | SYSTOLIC BLOOD PRESSURE: 106 MMHG | HEIGHT: 62 IN

## 2024-08-21 DIAGNOSIS — Z34.90 ENCOUNTER FOR SUPERVISION OF NORMAL PREGNANCY, UNSPECIFIED, UNSPECIFIED TRIMESTER: ICD-10-CM

## 2024-08-21 PROCEDURE — 0502F SUBSEQUENT PRENATAL CARE: CPT

## 2024-08-21 PROCEDURE — 76805 OB US >/= 14 WKS SNGL FETUS: CPT

## 2024-08-26 ENCOUNTER — NON-APPOINTMENT (OUTPATIENT)
Age: 29
End: 2024-08-26

## 2024-08-26 DIAGNOSIS — O99.019 ANEMIA COMPLICATING PREGNANCY, UNSPECIFIED TRIMESTER: ICD-10-CM

## 2024-08-26 LAB — TSH SERPL-ACNC: 3.48 UIU/ML

## 2024-08-26 RX ORDER — FERRIC MALTOL 30 MG/1
30 CAPSULE ORAL
Qty: 3 | Refills: 0 | Status: ACTIVE | COMMUNITY
Start: 2024-08-26 | End: 1900-01-01

## 2024-08-26 RX ORDER — LEVOTHYROXINE SODIUM 100 UG/1
100 TABLET ORAL DAILY
Qty: 30 | Refills: 4 | Status: ACTIVE | COMMUNITY
Start: 2024-08-26 | End: 1900-01-01

## 2024-09-01 LAB
BASOPHILS # BLD AUTO: 0.05 K/UL
BASOPHILS NFR BLD AUTO: 0.4 %
EOSINOPHIL # BLD AUTO: 0.15 K/UL
EOSINOPHIL NFR BLD AUTO: 1.3 %
HCT VFR BLD CALC: 33.4 %
HGB BLD-MCNC: 10.6 G/DL
IMM GRANULOCYTES NFR BLD AUTO: 0.9 %
LYMPHOCYTES # BLD AUTO: 2.21 K/UL
LYMPHOCYTES NFR BLD AUTO: 19.3 %
MAN DIFF?: NORMAL
MCHC RBC-ENTMCNC: 29.3 PG
MCHC RBC-ENTMCNC: 31.7 GM/DL
MCV RBC AUTO: 92.3 FL
MONOCYTES # BLD AUTO: 0.61 K/UL
MONOCYTES NFR BLD AUTO: 5.3 %
NEUTROPHILS # BLD AUTO: 8.31 K/UL
NEUTROPHILS NFR BLD AUTO: 72.8 %
PLATELET # BLD AUTO: 241 K/UL
RBC # BLD: 3.62 M/UL
RBC # FLD: 13.9 %
T4 FREE SERPL-MCNC: 1.2 NG/DL
WBC # FLD AUTO: 11.43 K/UL

## 2024-09-06 ENCOUNTER — APPOINTMENT (OUTPATIENT)
Dept: OBGYN | Facility: CLINIC | Age: 29
End: 2024-09-06
Payer: COMMERCIAL

## 2024-09-06 PROCEDURE — 36415 COLL VENOUS BLD VENIPUNCTURE: CPT

## 2024-09-08 LAB — T4 FREE SERPL-MCNC: 1.2 NG/DL

## 2024-09-09 ENCOUNTER — NON-APPOINTMENT (OUTPATIENT)
Age: 29
End: 2024-09-09

## 2024-09-10 ENCOUNTER — NON-APPOINTMENT (OUTPATIENT)
Age: 29
End: 2024-09-10

## 2024-09-10 LAB — TSH SERPL-ACNC: 4.78 UIU/ML

## 2024-09-10 RX ORDER — LEVOTHYROXINE SODIUM 0.11 MG/1
112 TABLET ORAL DAILY
Qty: 30 | Refills: 3 | Status: ACTIVE | COMMUNITY
Start: 2024-09-10 | End: 1900-01-01

## 2024-09-18 ENCOUNTER — APPOINTMENT (OUTPATIENT)
Dept: OBGYN | Facility: CLINIC | Age: 29
End: 2024-09-18
Payer: COMMERCIAL

## 2024-09-18 VITALS
DIASTOLIC BLOOD PRESSURE: 62 MMHG | WEIGHT: 131 LBS | HEIGHT: 60 IN | SYSTOLIC BLOOD PRESSURE: 100 MMHG | BODY MASS INDEX: 25.72 KG/M2

## 2024-09-18 DIAGNOSIS — Z3A.24 24 WEEKS GESTATION OF PREGNANCY: ICD-10-CM

## 2024-09-18 PROCEDURE — 0502F SUBSEQUENT PRENATAL CARE: CPT

## 2024-09-19 LAB
BASOPHILS # BLD AUTO: 0.07 K/UL
BASOPHILS NFR BLD AUTO: 0.7 %
EOSINOPHIL # BLD AUTO: 0.18 K/UL
EOSINOPHIL NFR BLD AUTO: 1.8 %
GLUCOSE 1H P 50 G GLC PO SERPL-MCNC: 101 MG/DL
HCT VFR BLD CALC: 37.4 %
HGB BLD-MCNC: 11.6 G/DL
IMM GRANULOCYTES NFR BLD AUTO: 1.3 %
LYMPHOCYTES # BLD AUTO: 2.18 K/UL
LYMPHOCYTES NFR BLD AUTO: 21.3 %
MAN DIFF?: NORMAL
MCHC RBC-ENTMCNC: 29.9 PG
MCHC RBC-ENTMCNC: 31 GM/DL
MCV RBC AUTO: 96.4 FL
MONOCYTES # BLD AUTO: 0.66 K/UL
MONOCYTES NFR BLD AUTO: 6.4 %
NEUTROPHILS # BLD AUTO: 7.03 K/UL
NEUTROPHILS NFR BLD AUTO: 68.5 %
PLATELET # BLD AUTO: 243 K/UL
RBC # BLD: 3.88 M/UL
RBC # FLD: 14.6 %
T4 FREE SERPL-MCNC: 1.3 NG/DL
TSH SERPL-ACNC: 2.46 UIU/ML
WBC # FLD AUTO: 10.25 K/UL

## 2024-10-14 ENCOUNTER — APPOINTMENT (OUTPATIENT)
Dept: OBGYN | Facility: CLINIC | Age: 29
End: 2024-10-14
Payer: COMMERCIAL

## 2024-10-14 VITALS — DIASTOLIC BLOOD PRESSURE: 69 MMHG | WEIGHT: 138 LBS | BODY MASS INDEX: 26.95 KG/M2 | SYSTOLIC BLOOD PRESSURE: 110 MMHG

## 2024-10-14 PROCEDURE — 0502F SUBSEQUENT PRENATAL CARE: CPT

## 2024-10-30 ENCOUNTER — APPOINTMENT (OUTPATIENT)
Dept: ANTEPARTUM | Facility: CLINIC | Age: 29
End: 2024-10-30
Payer: COMMERCIAL

## 2024-10-30 ENCOUNTER — ASOB RESULT (OUTPATIENT)
Age: 29
End: 2024-10-30

## 2024-10-30 ENCOUNTER — APPOINTMENT (OUTPATIENT)
Dept: OBGYN | Facility: CLINIC | Age: 29
End: 2024-10-30
Payer: COMMERCIAL

## 2024-10-30 VITALS
HEIGHT: 60 IN | SYSTOLIC BLOOD PRESSURE: 99 MMHG | BODY MASS INDEX: 27.48 KG/M2 | WEIGHT: 140 LBS | DIASTOLIC BLOOD PRESSURE: 65 MMHG

## 2024-10-30 PROCEDURE — 0502F SUBSEQUENT PRENATAL CARE: CPT

## 2024-10-30 PROCEDURE — 76816 OB US FOLLOW-UP PER FETUS: CPT

## 2024-10-30 PROCEDURE — 76819 FETAL BIOPHYS PROFIL W/O NST: CPT | Mod: 59

## 2024-11-02 LAB
BASOPHILS # BLD AUTO: 0.04 K/UL
BASOPHILS NFR BLD AUTO: 0.4 %
EOSINOPHIL # BLD AUTO: 0.26 K/UL
EOSINOPHIL NFR BLD AUTO: 2.3 %
HCT VFR BLD CALC: 35.7 %
HGB BLD-MCNC: 11.1 G/DL
IMM GRANULOCYTES NFR BLD AUTO: 1.8 %
LYMPHOCYTES # BLD AUTO: 1.96 K/UL
LYMPHOCYTES NFR BLD AUTO: 17.5 %
MAN DIFF?: NORMAL
MCHC RBC-ENTMCNC: 30.2 PG
MCHC RBC-ENTMCNC: 31.1 G/DL
MCV RBC AUTO: 97 FL
MONOCYTES # BLD AUTO: 0.68 K/UL
MONOCYTES NFR BLD AUTO: 6.1 %
NEUTROPHILS # BLD AUTO: 8.04 K/UL
NEUTROPHILS NFR BLD AUTO: 71.9 %
PLATELET # BLD AUTO: 189 K/UL
RBC # BLD: 3.68 M/UL
RBC # FLD: 14.5 %
T4 FREE SERPL-MCNC: 1.1 NG/DL
TSH SERPL-ACNC: 1.64 UIU/ML
WBC # FLD AUTO: 11.18 K/UL

## 2024-11-11 ENCOUNTER — NON-APPOINTMENT (OUTPATIENT)
Age: 29
End: 2024-11-11

## 2024-11-11 ENCOUNTER — APPOINTMENT (OUTPATIENT)
Dept: OBGYN | Facility: CLINIC | Age: 29
End: 2024-11-11
Payer: COMMERCIAL

## 2024-11-11 VITALS — BODY MASS INDEX: 27.73 KG/M2 | WEIGHT: 142 LBS | DIASTOLIC BLOOD PRESSURE: 69 MMHG | SYSTOLIC BLOOD PRESSURE: 101 MMHG

## 2024-11-11 PROCEDURE — 0502F SUBSEQUENT PRENATAL CARE: CPT

## 2024-11-25 ENCOUNTER — APPOINTMENT (OUTPATIENT)
Dept: OBGYN | Facility: CLINIC | Age: 29
End: 2024-11-25
Payer: COMMERCIAL

## 2024-11-25 VITALS
SYSTOLIC BLOOD PRESSURE: 101 MMHG | DIASTOLIC BLOOD PRESSURE: 65 MMHG | HEIGHT: 60 IN | WEIGHT: 142 LBS | BODY MASS INDEX: 27.88 KG/M2

## 2024-11-25 PROCEDURE — 0502F SUBSEQUENT PRENATAL CARE: CPT

## 2024-12-09 ENCOUNTER — NON-APPOINTMENT (OUTPATIENT)
Age: 29
End: 2024-12-09

## 2024-12-09 ENCOUNTER — APPOINTMENT (OUTPATIENT)
Dept: OBGYN | Facility: CLINIC | Age: 29
End: 2024-12-09
Payer: COMMERCIAL

## 2024-12-09 VITALS — WEIGHT: 145 LBS | BODY MASS INDEX: 28.32 KG/M2 | SYSTOLIC BLOOD PRESSURE: 99 MMHG | DIASTOLIC BLOOD PRESSURE: 66 MMHG

## 2024-12-09 DIAGNOSIS — E06.3 AUTOIMMUNE THYROIDITIS: ICD-10-CM

## 2024-12-09 DIAGNOSIS — Z3A.36 36 WEEKS GESTATION OF PREGNANCY: ICD-10-CM

## 2024-12-09 DIAGNOSIS — E03.9 HYPOTHYROIDISM, UNSPECIFIED: ICD-10-CM

## 2024-12-09 PROCEDURE — 0502F SUBSEQUENT PRENATAL CARE: CPT

## 2024-12-11 LAB
BASOPHILS # BLD AUTO: 0.05 K/UL
BASOPHILS NFR BLD AUTO: 0.4 %
EOSINOPHIL # BLD AUTO: 0.24 K/UL
EOSINOPHIL NFR BLD AUTO: 2 %
GP B STREP DNA SPEC QL NAA+PROBE: NOT DETECTED
HCT VFR BLD CALC: 35.8 %
HGB BLD-MCNC: 11.6 G/DL
HIV1+2 AB SPEC QL IA.RAPID: NONREACTIVE
IMM GRANULOCYTES NFR BLD AUTO: 1.5 %
LYMPHOCYTES # BLD AUTO: 2.39 K/UL
LYMPHOCYTES NFR BLD AUTO: 19.9 %
MAN DIFF?: NORMAL
MCHC RBC-ENTMCNC: 30.5 PG
MCHC RBC-ENTMCNC: 32.4 G/DL
MCV RBC AUTO: 94.2 FL
MONOCYTES # BLD AUTO: 0.84 K/UL
MONOCYTES NFR BLD AUTO: 7 %
NEUTROPHILS # BLD AUTO: 8.28 K/UL
NEUTROPHILS NFR BLD AUTO: 69.2 %
PLATELET # BLD AUTO: 217 K/UL
RBC # BLD: 3.8 M/UL
RBC # FLD: 14.1 %
SOURCE GBS: NORMAL
T3FREE SERPL-MCNC: 2.86 PG/ML
T4 FREE SERPL-MCNC: 1 NG/DL
TSH SERPL-ACNC: 1.46 UIU/ML
WBC # FLD AUTO: 11.98 K/UL

## 2024-12-16 ENCOUNTER — APPOINTMENT (OUTPATIENT)
Dept: OBGYN | Facility: CLINIC | Age: 29
End: 2024-12-16
Payer: COMMERCIAL

## 2024-12-16 VITALS — WEIGHT: 149 LBS | SYSTOLIC BLOOD PRESSURE: 98 MMHG | DIASTOLIC BLOOD PRESSURE: 58 MMHG | BODY MASS INDEX: 29.1 KG/M2

## 2024-12-16 PROCEDURE — 0502F SUBSEQUENT PRENATAL CARE: CPT

## 2024-12-24 DIAGNOSIS — Z3A.36 36 WEEKS GESTATION OF PREGNANCY: ICD-10-CM

## 2024-12-24 DIAGNOSIS — Z3A.24 24 WEEKS GESTATION OF PREGNANCY: ICD-10-CM

## 2024-12-27 ENCOUNTER — ASOB RESULT (OUTPATIENT)
Age: 29
End: 2024-12-27

## 2024-12-27 ENCOUNTER — APPOINTMENT (OUTPATIENT)
Dept: OBGYN | Facility: CLINIC | Age: 29
End: 2024-12-27
Payer: COMMERCIAL

## 2024-12-27 ENCOUNTER — APPOINTMENT (OUTPATIENT)
Dept: ANTEPARTUM | Facility: CLINIC | Age: 29
End: 2024-12-27
Payer: COMMERCIAL

## 2024-12-27 VITALS — DIASTOLIC BLOOD PRESSURE: 65 MMHG | SYSTOLIC BLOOD PRESSURE: 102 MMHG | WEIGHT: 147 LBS | BODY MASS INDEX: 28.71 KG/M2

## 2024-12-27 PROBLEM — O99.283 HYPOTHYROIDISM DURING PREGNANCY IN THIRD TRIMESTER: Status: ACTIVE | Noted: 2024-12-24

## 2024-12-27 PROBLEM — O09.93 SUPERVISION OF HIGH RISK PREGNANCY IN THIRD TRIMESTER: Status: ACTIVE | Noted: 2024-12-24

## 2024-12-27 PROBLEM — Z3A.38 38 WEEKS GESTATION OF PREGNANCY: Status: ACTIVE | Noted: 2024-12-24

## 2024-12-27 PROCEDURE — 76816 OB US FOLLOW-UP PER FETUS: CPT

## 2024-12-27 PROCEDURE — 0502F SUBSEQUENT PRENATAL CARE: CPT

## 2024-12-27 PROCEDURE — 76819 FETAL BIOPHYS PROFIL W/O NST: CPT

## 2024-12-29 PROBLEM — Z3A.38 38 WEEKS GESTATION OF PREGNANCY: Status: RESOLVED | Noted: 2024-12-24 | Resolved: 2024-12-29

## 2024-12-31 ENCOUNTER — APPOINTMENT (OUTPATIENT)
Dept: OBGYN | Facility: CLINIC | Age: 29
End: 2024-12-31
Payer: COMMERCIAL

## 2024-12-31 VITALS
HEIGHT: 60 IN | WEIGHT: 148 LBS | DIASTOLIC BLOOD PRESSURE: 70 MMHG | SYSTOLIC BLOOD PRESSURE: 103 MMHG | BODY MASS INDEX: 29.06 KG/M2

## 2024-12-31 DIAGNOSIS — O99.019 ANEMIA COMPLICATING PREGNANCY, UNSPECIFIED TRIMESTER: ICD-10-CM

## 2024-12-31 DIAGNOSIS — E03.9 ENDOCRINE, NUTRITIONAL AND METABOLIC DISEASES COMPLICATING PREGNANCY, THIRD TRIMESTER: ICD-10-CM

## 2024-12-31 DIAGNOSIS — Z3A.39 39 WEEKS GESTATION OF PREGNANCY: ICD-10-CM

## 2024-12-31 DIAGNOSIS — E06.3 AUTOIMMUNE THYROIDITIS: ICD-10-CM

## 2024-12-31 DIAGNOSIS — O99.283 ENDOCRINE, NUTRITIONAL AND METABOLIC DISEASES COMPLICATING PREGNANCY, THIRD TRIMESTER: ICD-10-CM

## 2024-12-31 DIAGNOSIS — O09.93 SUPERVISION OF HIGH RISK PREGNANCY, UNSPECIFIED, THIRD TRIMESTER: ICD-10-CM

## 2024-12-31 PROCEDURE — 0502F SUBSEQUENT PRENATAL CARE: CPT

## 2024-12-31 RX ORDER — LEVOTHYROXINE SODIUM 0.07 MG/1
75 TABLET ORAL
Qty: 90 | Refills: 0 | Status: ACTIVE | COMMUNITY
Start: 2024-12-31 | End: 1900-01-01

## 2025-01-08 ENCOUNTER — ASOB RESULT (OUTPATIENT)
Age: 30
End: 2025-01-08

## 2025-01-08 ENCOUNTER — APPOINTMENT (OUTPATIENT)
Dept: ANTEPARTUM | Facility: CLINIC | Age: 30
End: 2025-01-08
Payer: COMMERCIAL

## 2025-01-08 ENCOUNTER — APPOINTMENT (OUTPATIENT)
Dept: OBGYN | Facility: CLINIC | Age: 30
End: 2025-01-08
Payer: COMMERCIAL

## 2025-01-08 VITALS
HEIGHT: 60 IN | WEIGHT: 152 LBS | BODY MASS INDEX: 29.84 KG/M2 | SYSTOLIC BLOOD PRESSURE: 112 MMHG | DIASTOLIC BLOOD PRESSURE: 73 MMHG

## 2025-01-08 PROCEDURE — 0502F SUBSEQUENT PRENATAL CARE: CPT

## 2025-01-08 PROCEDURE — 76816 OB US FOLLOW-UP PER FETUS: CPT

## 2025-01-08 PROCEDURE — 76818 FETAL BIOPHYS PROFILE W/NST: CPT | Mod: 59

## 2025-01-10 ENCOUNTER — ASOB RESULT (OUTPATIENT)
Age: 30
End: 2025-01-10

## 2025-01-10 ENCOUNTER — APPOINTMENT (OUTPATIENT)
Dept: ANTEPARTUM | Facility: CLINIC | Age: 30
End: 2025-01-10
Payer: COMMERCIAL

## 2025-01-10 ENCOUNTER — APPOINTMENT (OUTPATIENT)
Dept: OBGYN | Facility: CLINIC | Age: 30
End: 2025-01-10
Payer: COMMERCIAL

## 2025-01-10 VITALS — WEIGHT: 152 LBS | DIASTOLIC BLOOD PRESSURE: 69 MMHG | SYSTOLIC BLOOD PRESSURE: 104 MMHG | BODY MASS INDEX: 29.69 KG/M2

## 2025-01-10 PROCEDURE — 0502F SUBSEQUENT PRENATAL CARE: CPT

## 2025-01-10 PROCEDURE — 76818 FETAL BIOPHYS PROFILE W/NST: CPT

## 2025-01-12 ENCOUNTER — INPATIENT (INPATIENT)
Facility: HOSPITAL | Age: 30
LOS: 1 days | Discharge: HOME | End: 2025-01-14
Attending: OBSTETRICS & GYNECOLOGY | Admitting: OBSTETRICS & GYNECOLOGY
Payer: COMMERCIAL

## 2025-01-12 VITALS — SYSTOLIC BLOOD PRESSURE: 114 MMHG | HEART RATE: 68 BPM | DIASTOLIC BLOOD PRESSURE: 57 MMHG

## 2025-01-12 DIAGNOSIS — O26.899 OTHER SPECIFIED PREGNANCY RELATED CONDITIONS, UNSPECIFIED TRIMESTER: ICD-10-CM

## 2025-01-12 LAB
BASOPHILS # BLD AUTO: 0.08 K/UL — SIGNIFICANT CHANGE UP (ref 0–0.2)
BASOPHILS NFR BLD AUTO: 0.4 % — SIGNIFICANT CHANGE UP (ref 0–2)
BLD GP AB SCN SERPL QL: NEGATIVE — SIGNIFICANT CHANGE UP
CK MB BLD-MCNC: 3 % — HIGH (ref 0–2.5)
CK MB CFR SERPL CALC: 12 NG/ML — HIGH
CK SERPL-CCNC: 399 U/L — HIGH (ref 25–170)
EOSINOPHIL # BLD AUTO: 0.25 K/UL — SIGNIFICANT CHANGE UP (ref 0–0.5)
EOSINOPHIL NFR BLD AUTO: 1.4 % — SIGNIFICANT CHANGE UP (ref 0–6)
HCT VFR BLD CALC: 37.4 % — SIGNIFICANT CHANGE UP (ref 34.5–45)
HGB BLD-MCNC: 12.5 G/DL — SIGNIFICANT CHANGE UP (ref 11.5–15.5)
IANC: 12.27 K/UL — HIGH (ref 1.8–7.4)
IMM GRANULOCYTES NFR BLD AUTO: 1.7 % — HIGH (ref 0–0.9)
LYMPHOCYTES # BLD AUTO: 22.1 % — SIGNIFICANT CHANGE UP (ref 13–44)
LYMPHOCYTES # BLD AUTO: 4.05 K/UL — HIGH (ref 1–3.3)
MCHC RBC-ENTMCNC: 29.8 PG — SIGNIFICANT CHANGE UP (ref 27–34)
MCHC RBC-ENTMCNC: 33.4 G/DL — SIGNIFICANT CHANGE UP (ref 32–36)
MCV RBC AUTO: 89.3 FL — SIGNIFICANT CHANGE UP (ref 80–100)
MONOCYTES # BLD AUTO: 1.39 K/UL — HIGH (ref 0–0.9)
MONOCYTES NFR BLD AUTO: 7.6 % — SIGNIFICANT CHANGE UP (ref 2–14)
NEUTROPHILS # BLD AUTO: 12.27 K/UL — HIGH (ref 1.8–7.4)
NEUTROPHILS NFR BLD AUTO: 66.8 % — SIGNIFICANT CHANGE UP (ref 43–77)
NRBC # BLD: 0 /100 WBCS — SIGNIFICANT CHANGE UP (ref 0–0)
NRBC # FLD: 0 K/UL — SIGNIFICANT CHANGE UP (ref 0–0)
PLATELET # BLD AUTO: 212 K/UL — SIGNIFICANT CHANGE UP (ref 150–400)
RBC # BLD: 4.19 M/UL — SIGNIFICANT CHANGE UP (ref 3.8–5.2)
RBC # FLD: 13.5 % — SIGNIFICANT CHANGE UP (ref 10.3–14.5)
RH IG SCN BLD-IMP: POSITIVE — SIGNIFICANT CHANGE UP
T PALLIDUM AB TITR SER: NEGATIVE — SIGNIFICANT CHANGE UP
TROPONIN T, HIGH SENSITIVITY RESULT: <6 NG/L — SIGNIFICANT CHANGE UP
WBC # BLD: 18.36 K/UL — HIGH (ref 3.8–10.5)
WBC # FLD AUTO: 18.36 K/UL — HIGH (ref 3.8–10.5)

## 2025-01-12 PROCEDURE — 59400 OBSTETRICAL CARE: CPT | Mod: U9

## 2025-01-12 PROCEDURE — 93010 ELECTROCARDIOGRAM REPORT: CPT | Mod: 76

## 2025-01-12 RX ORDER — OXYCODONE HCL 15 MG
5 TABLET ORAL ONCE
Refills: 0 | Status: DISCONTINUED | OUTPATIENT
Start: 2025-01-12 | End: 2025-01-14

## 2025-01-12 RX ORDER — CLOSTRIDIUM TETANI TOXOID ANTIGEN (FORMALDEHYDE INACTIVATED), CORYNEBACTERIUM DIPHTHERIAE TOXOID ANTIGEN (FORMALDEHYDE INACTIVATED), BORDETELLA PERTUSSIS TOXOID ANTIGEN (GLUTARALDEHYDE INACTIVATED), BORDETELLA PERTUSSIS FILAMENTOUS HEMAGGLUTININ ANTIGEN (FORMALDEHYDE INACTIVATED), BORDETELLA PERTUSSIS PERTACTIN ANTIGEN, AND BORDETELLA PERTUSSIS FIMBRIAE 2/3 ANTIGEN 5; 2; 2.5; 5; 3; 5 [LF]/.5ML; [LF]/.5ML; UG/.5ML; UG/.5ML; UG/.5ML; UG/.5ML
0.5 INJECTION, SUSPENSION INTRAMUSCULAR ONCE
Refills: 0 | Status: DISCONTINUED | OUTPATIENT
Start: 2025-01-12 | End: 2025-01-14

## 2025-01-12 RX ORDER — DIPHENHYDRAMINE HCL 25 MG
25 TABLET ORAL EVERY 6 HOURS
Refills: 0 | Status: DISCONTINUED | OUTPATIENT
Start: 2025-01-12 | End: 2025-01-14

## 2025-01-12 RX ORDER — KETOROLAC TROMETHAMINE 30 MG/ML
30 INJECTION INTRAMUSCULAR; INTRAVENOUS ONCE
Refills: 0 | Status: DISCONTINUED | OUTPATIENT
Start: 2025-01-12 | End: 2025-01-12

## 2025-01-12 RX ORDER — FAMOTIDINE 20 MG/1
20 TABLET, FILM COATED ORAL ONCE
Refills: 0 | Status: COMPLETED | OUTPATIENT
Start: 2025-01-12 | End: 2025-01-12

## 2025-01-12 RX ORDER — SODIUM CHLORIDE 9 MG/ML
1000 INJECTION, SOLUTION INTRAVENOUS
Refills: 0 | Status: DISCONTINUED | OUTPATIENT
Start: 2025-01-12 | End: 2025-01-12

## 2025-01-12 RX ORDER — OXYTOCIN IN 5 % DEXTROSE 20/500ML
167 PLASTIC BAG, INJECTION (ML) INTRAVENOUS
Qty: 30 | Refills: 0 | Status: DISCONTINUED | OUTPATIENT
Start: 2025-01-12 | End: 2025-01-12

## 2025-01-12 RX ORDER — CHLORHEXIDINE GLUCONATE 1.2 MG/ML
1 RINSE ORAL DAILY
Refills: 0 | Status: DISCONTINUED | OUTPATIENT
Start: 2025-01-12 | End: 2025-01-12

## 2025-01-12 RX ORDER — LANOLIN
1 OINTMENT (GRAM) TOPICAL EVERY 6 HOURS
Refills: 0 | Status: DISCONTINUED | OUTPATIENT
Start: 2025-01-12 | End: 2025-01-14

## 2025-01-12 RX ORDER — CITRIC ACID/SODIUM CITRATE 334-500MG
15 SOLUTION, ORAL ORAL EVERY 6 HOURS
Refills: 0 | Status: DISCONTINUED | OUTPATIENT
Start: 2025-01-12 | End: 2025-01-12

## 2025-01-12 RX ORDER — MAGNESIUM HYDROXIDE 400 MG/5ML
30 SUSPENSION, ORAL (FINAL DOSE FORM) ORAL
Refills: 0 | Status: DISCONTINUED | OUTPATIENT
Start: 2025-01-12 | End: 2025-01-14

## 2025-01-12 RX ORDER — SODIUM CHLORIDE 9 MG/ML
3 INJECTION, SOLUTION INTRAMUSCULAR; INTRAVENOUS; SUBCUTANEOUS EVERY 8 HOURS
Refills: 0 | Status: DISCONTINUED | OUTPATIENT
Start: 2025-01-12 | End: 2025-01-14

## 2025-01-12 RX ORDER — SIMETHICONE 125 MG/1
80 CAPSULE, LIQUID FILLED ORAL EVERY 4 HOURS
Refills: 0 | Status: DISCONTINUED | OUTPATIENT
Start: 2025-01-12 | End: 2025-01-14

## 2025-01-12 RX ORDER — INFLUENZA A VIRUS A/WISCONSIN/588/2019 (H1N1) RECOMBINANT HEMAGGLUTININ ANTIGEN, INFLUENZA A VIRUS A/DARWIN/6/2021 (H3N2) RECOMBINANT HEMAGGLUTININ ANTIGEN, INFLUENZA B VIRUS B/AUSTRIA/1359417/2021 RECOMBINANT HEMAGGLUTININ ANTIGEN, AND INFLUENZA B VIRUS B/PHUKET/3073/2013 RECOMBINANT HEMAGGLUTININ ANTIGEN 45; 45; 45; 45 UG/.5ML; UG/.5ML; UG/.5ML; UG/.5ML
0.5 INJECTION INTRAMUSCULAR ONCE
Refills: 0 | Status: DISCONTINUED | OUTPATIENT
Start: 2025-01-12 | End: 2025-01-14

## 2025-01-12 RX ORDER — OXYCODONE HCL 15 MG
5 TABLET ORAL
Refills: 0 | Status: DISCONTINUED | OUTPATIENT
Start: 2025-01-12 | End: 2025-01-14

## 2025-01-12 RX ORDER — LEVOTHYROXINE SODIUM 175 UG/1
112 TABLET ORAL DAILY
Refills: 0 | Status: DISCONTINUED | OUTPATIENT
Start: 2025-01-12 | End: 2025-01-14

## 2025-01-12 RX ORDER — IBUPROFEN 200 MG
600 TABLET ORAL EVERY 6 HOURS
Refills: 0 | Status: DISCONTINUED | OUTPATIENT
Start: 2025-01-12 | End: 2025-01-14

## 2025-01-12 RX ORDER — KETOROLAC TROMETHAMINE 30 MG/ML
30 INJECTION INTRAMUSCULAR; INTRAVENOUS EVERY 6 HOURS
Refills: 0 | Status: COMPLETED | OUTPATIENT
Start: 2025-01-12 | End: 2025-01-13

## 2025-01-12 RX ORDER — LEVOTHYROXINE SODIUM 175 UG/1
1 TABLET ORAL
Refills: 0 | DISCHARGE

## 2025-01-12 RX ORDER — WITCH HAZEL 0.5 ML/ML
1 SOLUTION TOPICAL EVERY 4 HOURS
Refills: 0 | Status: DISCONTINUED | OUTPATIENT
Start: 2025-01-12 | End: 2025-01-14

## 2025-01-12 RX ORDER — IBUPROFEN 200 MG
600 TABLET ORAL EVERY 6 HOURS
Refills: 0 | Status: COMPLETED | OUTPATIENT
Start: 2025-01-12 | End: 2025-12-11

## 2025-01-12 RX ORDER — PNV/FERROUS FUM/DOCUSATE/FOLIC 90-50-1MG
1 TABLET, EXTENDED RELEASE ORAL DAILY
Refills: 0 | Status: DISCONTINUED | OUTPATIENT
Start: 2025-01-12 | End: 2025-01-14

## 2025-01-12 RX ORDER — HYDROCORTISONE 1 %
1 CREAM (GRAM) TOPICAL EVERY 6 HOURS
Refills: 0 | Status: DISCONTINUED | OUTPATIENT
Start: 2025-01-12 | End: 2025-01-14

## 2025-01-12 RX ORDER — BENZOCAINE/MENTH/CETYLPYRD CL 15 MG-4 MG
1 LOZENGE MUCOUS MEMBRANE EVERY 6 HOURS
Refills: 0 | Status: DISCONTINUED | OUTPATIENT
Start: 2025-01-12 | End: 2025-01-14

## 2025-01-12 RX ORDER — ACETAMINOPHEN 80 MG/.8ML
975 SOLUTION/ DROPS ORAL
Refills: 0 | Status: DISCONTINUED | OUTPATIENT
Start: 2025-01-12 | End: 2025-01-14

## 2025-01-12 RX ADMIN — ACETAMINOPHEN 975 MILLIGRAM(S): 80 SOLUTION/ DROPS ORAL at 17:21

## 2025-01-12 RX ADMIN — Medication 600 MILLIGRAM(S): at 21:40

## 2025-01-12 RX ADMIN — Medication 167 MILLIUNIT(S)/MIN: at 07:21

## 2025-01-12 RX ADMIN — FAMOTIDINE 20 MILLIGRAM(S): 20 TABLET, FILM COATED ORAL at 08:35

## 2025-01-12 RX ADMIN — Medication 600 MILLIGRAM(S): at 21:02

## 2025-01-12 RX ADMIN — Medication 1 APPLICATION(S): at 11:27

## 2025-01-12 RX ADMIN — LEVOTHYROXINE SODIUM 112 MICROGRAM(S): 175 TABLET ORAL at 08:49

## 2025-01-12 RX ADMIN — SODIUM CHLORIDE 3 MILLILITER(S): 9 INJECTION, SOLUTION INTRAMUSCULAR; INTRAVENOUS; SUBCUTANEOUS at 21:00

## 2025-01-12 RX ADMIN — ACETAMINOPHEN 975 MILLIGRAM(S): 80 SOLUTION/ DROPS ORAL at 07:47

## 2025-01-12 RX ADMIN — WITCH HAZEL 1 APPLICATION(S): 0.5 SOLUTION TOPICAL at 11:27

## 2025-01-12 RX ADMIN — KETOROLAC TROMETHAMINE 30 MILLIGRAM(S): 30 INJECTION INTRAMUSCULAR; INTRAVENOUS at 11:20

## 2025-01-12 NOTE — OB RN DELIVERY SUMMARY - NS_RNDELIVATTEST_OBGYN_ALL_OB
OB Provider reported that the vagina was inspected and no foreign body was found/Laps, needles and instrument count was correct
admitted for Sepsis and UTI

## 2025-01-12 NOTE — DISCHARGE NOTE OB - CARE PLAN
1 Principal Discharge DX:	Vaginal delivery  Assessment and plan of treatment:	Reg diet, analgesia PRN, pelvic rest, OOB

## 2025-01-12 NOTE — CHART NOTE - NSCHARTNOTEFT_GEN_A_CORE
S: MD at bedside to assess pt chest pressure. Per pt, she experienced similar symptoms after her prior delivery. She endorses chest pressure/pain left to midline. She denies SOB and reflux symptoms.     O:  AF this AM  HR 60s-80s  BP 90s-100s/50s  O2sat  RA    Gen: NAD  Lungs: CTAB  CV: RRR, no M/R/G    A/P:   29y  PPD0  endorsing chest pain/pressure. Vitals stable. Denies SOB, lungs clear. Plan for ECG to r/o cardiac etiology.    - ECG  - Pepcid IV  - Cw vitals monitoring    Odilon Walls PGY1  Dw Dr. Arguello

## 2025-01-12 NOTE — DISCHARGE NOTE OB - PATIENT PORTAL LINK FT
You can access the FollowMyHealth Patient Portal offered by [facility] by registering at the following website: [email or web address]. By joining [employer]’s FollowMyHealth portal, you will also be able to view your health information using other applications (apps) compatible with our system.

## 2025-01-12 NOTE — OB PROVIDER DELIVERY SUMMARY - NSPROVIDERDELIVERYNOTE_OBGYN_ALL_OB_FT
Pt fully dilated and pushing.  Delivered viable infant over periurethral laceration.  Nose and mouth bulb suctioned.  Infant handed to mother.  Cord clamped and cut after delay. Cord gases sent.  Placenta delivered spontaneously and intact.  periurethral laceration repaired with excellent hemostasis and restoration of anatomy.  Fundus firm.  Vault empty.

## 2025-01-12 NOTE — OB RN DELIVERY SUMMARY - NS_PLACENTDISPOA_OBGYN_ALL_OB
Called and spoke to pharmacist Angelita and informed her of Dr. Pierson's msg. Per Angelita there was an rx sent, filled and picked up for lisinopril yesterday 6/10.     Will contact pt to dc that and pharmacy is dcing in their system as well.   Was kept on unit for 24 hours

## 2025-01-12 NOTE — OB PROVIDER H&P - NSHPPHYSICALEXAM_GEN_ALL_CORE
Vital Signs Last 24 Hrs  T(C): --  T(F): --  HR: --  BP: --  BP(mean): --  RR: --  SpO2: --    Gen: NAD  Abd: gravid  Neuro: alert    EFM: 135bpm, moderate variability, no accels, no decels - cat I, minimal amount of tracing  toco: regular ctx q2min     SVE 9cm by Dr. Paez

## 2025-01-12 NOTE — OB PROVIDER DELIVERY SUMMARY - NSSELHIDDEN_OBGYN_ALL_OB_FT
[NS_DeliveryAttending1_OBGYN_ALL_OB_FT:[id number]==],[NS_DeliveryAttending2_OBGYN_ALL_OB_FT:[id number]=]

## 2025-01-12 NOTE — OB RN TRIAGE NOTE - NS_DISPOSITION_OBGYN_ALL_OB
PT Name: Prema Phillips  MR #: [id number]     Documentation Clarification      CDS/: Davina Card RN, CDS              Contact information: [email or web address]    This form is a permanent document in the medical record.     Query Date: March 13, 2024    By submitting this query, we are merely seeking further clarification of documentation. Please utilize your independent clinical judgment when addressing the question(s) below.    The Medical Record reflects the following:    Supporting Clinical Findings Location in Medical Record     --Cerebrovascular accident (CVA) due to embolism of left middle cerebral artery          -3/7/24: Transfer for L M2 occlusion on CTA after NIH 10 w/ RUE weakness        -On post-angio pathway with exam NIH 9. RUE with improving strength, no drift. Remainder of exam non-focal.         5b. Motor Arm, Right: 0-->No drift, limb holds 90 (or 45) degrees for full 10 secs        6b. Motor Leg, Right: 0-->No drift, leg holds 30 degree position for full 5 secs    RLE ROM: WFL  RLE Strength: Deficits: hip flexion 4/5, knee extension 5/5, knee flexion KELSIE 2/2 unable to follow commands, DF 5/5    Arm right  Paresis: 4/5  Leg right Normal 5/5  Positive for speech difficulty and weakness    -- Pt had difficulty with R hand weakness, placing R  hand on RW appropriately    --Hemiparesis as late effect of cerebrovascular disease       --Hemiparesis of right dominant side due to acute cerebrovascular disease       Vas Neuro note 3/8 (Dr Haas/Arcadio)            PT Eval Note 3/8        Vas Neuro Note 3/9 (Dr Haas/Angel)      OT eval Note 3/11     Hosp med care Update Note 3/12 ([facility])    Discharge Summary 3/13 ([facility] Zavala)                                                                              Provider, please clarify the Right sided weakness.    [   xxx] RUE monoplegia  THIS IS WELL ENOUGH DOCUMENTED.... unnecessary query.   [   ] Right sided hemiplegia (both RUE and RLE)      [   ] Other (please specify): ____________   [  ] Clinically undetermined                                                                                                           Present on admission (POA) status:   [   ] Yes (Y)                          [  ] Clinically Undetermined (W)  [   ] No (N)                            [   ] Documentation insufficient to determine if condition is POA (U)                Continue to Observe

## 2025-01-12 NOTE — OB RN PATIENT PROFILE - NSPRENATALGBS_OBGYN_ALL_OB_GET_DAYS
Final Anesthesia Post-op Assessment    Patient: Shant Meneses  Procedure(s) Performed: ABLATION ATRIAL FIB - CV; MAPPING INTRACARDIAC 3-D; DRUG STUDY/CHALLENGE; TRANSSEPTAL PUNCTURE; ULTRASOUND ACCESS; CARDIOVERSION; EP STUDY  Anesthesia type: General    Vitals Value Taken Time   Temp 36.8 °C (98.2 °F) 11/7/2019  7:42 AM   Pulse 90 11/7/2019  7:42 AM   Resp 15 11/7/2019  7:42 AM   SpO2 96 % 11/7/2019  7:42 AM   /74 11/7/2019  7:42 AM       Last 24 I/O:     Intake/Output Summary (Last 24 hours) at 11/7/2019 2133  Last data filed at 11/7/2019 0937  Gross per 24 hour   Intake 600 ml   Output 550 ml   Net 50 ml         Patient Location: Patient location: Discharged.  Post-op Vital Signs:stable  Level of Consciousness: participates in exam  Respiratory Status: spontaneous ventilation  Cardiovascular stable  Hydration: euvolemic  Pain Management: adequately controlled  Nausea: None  Airway Patency:patent  Post-op Assessment: awake, alert, appropriately conversant, or baseline, no complications, patient tolerated procedure well with no complications and evidence of recall  Comments: Nursing notes reviewed. Euvolemic and vital signs stable. Pain and nausea well controlled. No complications. Discharged to home.       34

## 2025-01-12 NOTE — OB PROVIDER H&P - HISTORY OF PRESENT ILLNESS
28yo  admitted at 41.0wga (PATRICK 2025) in spontaneous labor. Presented to triage for ctx and found to be 9cm. -vb/lof. +Good FM.   Pregnancy c/b hypothyroidism. GBS negative   PNC: Nidia    ObHx:  -G1  2023 7#14oz  -G2 current    GynHx: denies  PMHx: hypothyroidism  PSHx: denies  M: synthroid  A: nkda  SocHx: denies tobacco, ETOH, illicit substances

## 2025-01-12 NOTE — OB PROVIDER DELIVERY SUMMARY - NSVAGINALEXAMCERT_OBGYN_ALL_OB
Statement Selected The Delivery OB Provider certifies that vaginal examination and/or abdominal examination after the delivery was done and no foreign body was found.

## 2025-01-12 NOTE — DISCHARGE NOTE OB - NSDCQMERRANDS_GEN_ALL_CORE
Physical Therapy  DATE: 2024    NAME: Estefany Garcia  MRN: [id number]   : 1957    Patient not seen this date for Physical Therapy due to:      [] Cancel by RN or physician due to:    [x] Hemodialysis; checked on pt @ 1115. Pt OOR for dialysis. Will continue to follow.     [] Critical Lab Value Level     [] Blood transfusion in progress    [] Acute or unstable cardiovascular status   _MAP < 55 or more than >115  _HR < 40 or > 130    [] Acute or unstable pulmonary status   -FiO2 > 60%   _RR < 5 or >40    _O2 sats < 85%    [] Strict Bedrest    [] Off Unit for surgery or procedure    [] Off Unit for testing       [] Pending imaging to R/O fracture    [] Refusal by Patient      [] Other      [] PT being discontinued at this time. Patient independent. No further needs.     [] PT being discontinued at this time as the patient has been transferred to hospice care. No further needs.      Electronically signed by Wendy Bonilla PTA on 24 at 11:25 AM EDT      No

## 2025-01-12 NOTE — DISCHARGE NOTE OB - FINANCIAL ASSISTANCE
[facility] provides services at a reduced cost to those who are determined to be eligible through [facility]’s financial assistance program. Information regarding [facility]’s financial assistance program can be found by going to [email or web address] or by calling [phone].

## 2025-01-12 NOTE — OB PROVIDER H&P - NS_PREOPBLOODCONS_OBGYN_ALL_OB
Writer gave patient a call and changed appointment from 05/18/22 and kept the 05/25/22 and patient felt comfortable waiting for it.Writer will update tracker.    Jessica Lay  05/18/22  164     n/a

## 2025-01-12 NOTE — OB RN PATIENT PROFILE - CHOOSE INDICATION TO IMMUNIZE (AN ORDER WILL BE GENERATED WHEN THIS NOTE IS SAVED):
Patient hospice RN Contact info:    [facility] [phone]    [place] [facility].     Patient is pregnant regardless of trimester (administer thimerosal-free vaccine)

## 2025-01-12 NOTE — DISCHARGE NOTE OB - CARE PROVIDER_API CALL
Jamarcus Jacob  Maternal/Fetal Medicine  [address], [address]  [place] [address]  Phone: [phone]  Fax: [phone]  Follow Up Time:

## 2025-01-12 NOTE — OB RN DELIVERY SUMMARY - NS_SEPSISRSKCALC_OBGYN_ALL_OB_FT
EOS calculated successfully. EOS Risk Factor: 0.5/1000 live births ([facility] national incidence); GA=41w;Temp=98.4; ROM=0.167; GBS='Negative'; Antibiotics='No antibiotics or any antibiotics < 2 hrs prior to birth'

## 2025-01-12 NOTE — OB RN DELIVERY SUMMARY - NSSELHIDDEN_OBGYN_ALL_OB_FT
[[email or web address]_OBGYN_ALL_OB_FT:[id number]],[NS_DeliveryAttending2_OBGYN_ALL_OB_FT:[id number]],[NS_DeliveryRN_OBGYN_ALL_OB_FT:[id number]]

## 2025-01-12 NOTE — OB RN PATIENT PROFILE - LIMIT VISITORS, INFANT PROFILE
INDICATION:



Right upper quadrant pain.



TECHNIQUE:



Ultrasound abdomen limited.  



Sonographic images of the right upper quadrant were obtained using 

gray-scale and color Doppler images.



COMPARISON:



No comparison



FINDINGS:



Liver: Normal in size and echotexture.  No masses.  No intrahepatic biliary 

dilatation.  



Gallbladder: No stones or sludge.  Normal wall thickness.  No 

pericholecystic fluid.  



Common bile duct: 4 mm.  



Pancreas: Obscured by gas 



Right kidney: 9.2 cm.  Normal echotexture and cortex.  No masses, stones, 

or hydronephrosis.  



Vasculature: Proximal abdominal aorta and IVC are unremarkable.  



IMPRESSION:



Unremarkable right upper quadrant ultrasound.



Dictated by Tyshawn Funes MD @ Feb 14 2019 11:36AM



Signed by Dr. Tyshawn Funes @ Feb 14 2019 11:36AM no

## 2025-01-12 NOTE — OB RN DELIVERY SUMMARY - BABY A: APGAR 5 MIN COLOR, DELIVERY
----- Message from Jakob Diallo MD sent at 9/17/2021 12:40 PM CDT -----  Improvement in sodium level noted.     (1) body pink, extremities blue

## 2025-01-12 NOTE — DISCHARGE NOTE OB - MEDICATION SUMMARY - MEDICATIONS TO TAKE
I will START or STAY ON the medications listed below when I get home from the hospital:    ibuprofen 600 mg oral tablet  -- 1 tab(s) by mouth every 6 hours  -- Indication: For pain     acetaminophen 325 mg oral tablet  -- 3 tab(s) by mouth every 8 hours as needed for pain  -- Indication: For pain     Prenatal Multivitamins with Folic Acid 1 mg oral tablet  -- 1 tab(s) by mouth once a day  -- Indication: For Vitamin     levothyroxine 112 mcg (0.112 mg) oral tablet  -- 1 tab(s) by mouth once a day  -- Indication: For thyroid disorder

## 2025-01-12 NOTE — OB RN PATIENT PROFILE - FALL HARM RISK - UNIVERSAL INTERVENTIONS
Bed in lowest position, wheels locked, appropriate side rails in place/Call bell, personal items and telephone in reach/Instruct patient to call for assistance before getting out of bed or chair/Non-slip footwear when patient is out of bed/[place] to call system/Physically safe environment - no spills, clutter or unnecessary equipment/Purposeful Proactive Rounding/Room/bathroom lighting operational, light cord in reach

## 2025-01-12 NOTE — OB PROVIDER H&P - ASSESSMENT
28yo  admitted at 41.0wga ([facility] 2025) in spontaneous labor. Pregnancy c/b hypothyroidism. GBS negative   PNC: [facility]    -plan for expectant management  -pit augmentation prn  -AROM when feasible  -pain mgmt: epidural prn  -anticipate     Pt discussed w/ Dr. Milly Lawler MD

## 2025-01-13 LAB
HCT VFR BLD CALC: 34 % — LOW (ref 34.5–45)
HGB BLD-MCNC: 11.2 G/DL — LOW (ref 11.5–15.5)
MCHC RBC-ENTMCNC: 29.8 PG — SIGNIFICANT CHANGE UP (ref 27–34)
MCHC RBC-ENTMCNC: 32.9 G/DL — SIGNIFICANT CHANGE UP (ref 32–36)
MCV RBC AUTO: 90.4 FL — SIGNIFICANT CHANGE UP (ref 80–100)
NRBC # BLD: 0 /100 WBCS — SIGNIFICANT CHANGE UP (ref 0–0)
NRBC # FLD: 0 K/UL — SIGNIFICANT CHANGE UP (ref 0–0)
PLATELET # BLD AUTO: 188 K/UL — SIGNIFICANT CHANGE UP (ref 150–400)
RBC # BLD: 3.76 M/UL — LOW (ref 3.8–5.2)
RBC # FLD: 13.9 % — SIGNIFICANT CHANGE UP (ref 10.3–14.5)
WBC # BLD: 15.37 K/UL — HIGH (ref 3.8–10.5)
WBC # FLD AUTO: 15.37 K/UL — HIGH (ref 3.8–10.5)

## 2025-01-13 RX ORDER — ACETAMINOPHEN 80 MG/.8ML
3 SOLUTION/ DROPS ORAL
Qty: 0 | Refills: 0 | DISCHARGE
Start: 2025-01-13

## 2025-01-13 RX ORDER — IBUPROFEN 200 MG
1 TABLET ORAL
Qty: 0 | Refills: 0 | DISCHARGE
Start: 2025-01-13

## 2025-01-13 RX ORDER — PNV/FERROUS FUM/DOCUSATE/FOLIC 90-50-1MG
1 TABLET, EXTENDED RELEASE ORAL
Qty: 0 | Refills: 0 | DISCHARGE
Start: 2025-01-13

## 2025-01-13 RX ADMIN — LEVOTHYROXINE SODIUM 112 MICROGRAM(S): 175 TABLET ORAL at 09:47

## 2025-01-13 RX ADMIN — SODIUM CHLORIDE 3 MILLILITER(S): 9 INJECTION, SOLUTION INTRAMUSCULAR; INTRAVENOUS; SUBCUTANEOUS at 13:20

## 2025-01-13 RX ADMIN — SODIUM CHLORIDE 3 MILLILITER(S): 9 INJECTION, SOLUTION INTRAMUSCULAR; INTRAVENOUS; SUBCUTANEOUS at 21:10

## 2025-01-13 RX ADMIN — Medication 600 MILLIGRAM(S): at 11:33

## 2025-01-13 RX ADMIN — SODIUM CHLORIDE 3 MILLILITER(S): 9 INJECTION, SOLUTION INTRAMUSCULAR; INTRAVENOUS; SUBCUTANEOUS at 06:00

## 2025-01-13 RX ADMIN — ACETAMINOPHEN 975 MILLIGRAM(S): 80 SOLUTION/ DROPS ORAL at 20:41

## 2025-01-13 RX ADMIN — Medication 1 TABLET(S): at 11:33

## 2025-01-13 RX ADMIN — ACETAMINOPHEN 975 MILLIGRAM(S): 80 SOLUTION/ DROPS ORAL at 21:10

## 2025-01-13 RX ADMIN — Medication 600 MILLIGRAM(S): at 12:30

## 2025-01-13 NOTE — PROGRESS NOTE ADULT - ATTENDING COMMENTS
pt seen and examined this am   chest pressure resolved   recovering well postpartum   continue plan as above

## 2025-01-13 NOTE — PROGRESS NOTE ADULT - SUBJECTIVE AND OBJECTIVE BOX
OB Progress Note:  PPD#1    S: 30yo PPD#1 s/p . Patient c/p chest pressure/pain yesterday after delivery, but has since resolved. Patient feels well. Pain is well controlled. She is tolerating a regular diet and passing flatus. She is voiding spontaneously, and ambulating without difficulty. Denies CP/SOB. Denies lightheadedness/dizziness. Denies N/V.    O:  Vitals:  Vital Signs Last 24 Hrs  T(C): 36.7 (2025 01:50), Max: 37 (2025 09:32)  T(F): 98.1 (2025 01:50), Max: 98.6 (2025 09:32)  HR: 67 (2025 01:50) (63 - 92)  BP: 100/54 (2025 01:50) (97/55 - 118/62)  BP(mean): --  RR: 18 (2025 01:50) (16 - 24)  SpO2: 99% (2025 01:50) (96% - 100%)    Parameters below as of 2025 01:50  Patient On (Oxygen Delivery Method): room air        MEDICATIONS  (STANDING):  acetaminophen     Tablet .. 975 milliGRAM(s) Oral <User Schedule>  diphtheria/tetanus/pertussis (acellular) Vaccine (Adacel) 0.5 milliLiter(s) IntraMuscular once  ibuprofen  Tablet. 600 milliGRAM(s) Oral every 6 hours  influenza   Vaccine 0.5 milliLiter(s) IntraMuscular once  ketorolac   Injectable 30 milliGRAM(s) IV Push every 6 hours  levothyroxine 112 MICROGram(s) Oral daily  prenatal multivitamin 1 Tablet(s) Oral daily  sodium chloride 0.9% lock flush 3 milliLiter(s) IV Push every 8 hours      Labs:  Blood type: A Positive  Rubella IgG: RPR: Negative                          12.5   18.36[H] >-----------< 212    (  @ 06:25 )             37.4                  Physical Exam:  General: NAD  Abdomen: soft, non-tender, non-distended, fundus firm  Vaginal: Lochia wnl  Extremities: No erythema/edema

## 2025-01-13 NOTE — PROGRESS NOTE ADULT - ASSESSMENT
A/P: 30yo PPD#1 s/p . QBL 97. Patient is stable and doing well post-partum. VSS, AF.    #Chest pain/pressure, resolved  - Patient with chest pressure on PPD#0  - EKG () x2: normal sinus rhythm - reviewed by cardiology fellow Ramu Collazo stating no STEMI changes.  - Cardiac enzymes wnl for postpartum  - VSS, patient asymptomatic now  - Conservative management per cardiology    #Routine postpartum care  - Pain well controlled, continue current pain regimen  - Increase ambulation, SCDs when not ambulating  - Continue regular diet    Alexia Mi PGY1

## 2025-01-14 VITALS
DIASTOLIC BLOOD PRESSURE: 68 MMHG | TEMPERATURE: 98 F | OXYGEN SATURATION: 99 % | HEART RATE: 79 BPM | RESPIRATION RATE: 18 BRPM | SYSTOLIC BLOOD PRESSURE: 115 MMHG

## 2025-01-14 RX ADMIN — LEVOTHYROXINE SODIUM 112 MICROGRAM(S): 175 TABLET ORAL at 08:07

## 2025-01-14 RX ADMIN — SODIUM CHLORIDE 3 MILLILITER(S): 9 INJECTION, SOLUTION INTRAMUSCULAR; INTRAVENOUS; SUBCUTANEOUS at 06:00

## 2025-01-14 NOTE — PROGRESS NOTE ADULT - SUBJECTIVE AND OBJECTIVE BOX
S: 30yo PPD#2 s/p  with periurethral tear. Patient c/o chest pain/pressure while in labor and delivery; s/p EKG and cardiac enzymes which were reviewed by cardiology and patient subsequently cleared. QBL 97, H/H 12.5/37.4->11.2/34. Patient feels well. Pain is well controlled. She is tolerating a regular diet and passing flatus. She is voiding spontaneously, and ambulating without difficulty. Denies CP/SOB. Denies lightheadedness/dizziness. Denies N/V.    O:  Vitals:   Vital Signs Last 24 Hrs  T(C): 36.8 (2025 05:09), Max: 36.8 (2025 05:09)  T(F): 98.3 (2025 05:09), Max: 98.3 (2025 05:09)  HR: 60 (2025 05:09) (60 - 82)  BP: 120/72 (2025 05:09) (111/66 - 120/72)  BP(mean): --  RR: 18 (2025 05:09) (18 - 18)  SpO2: 99% (2025 05:09) (99% - 100%)    Parameters above as of 2025 05:09  Patient On (Oxygen Delivery Method): room air        MEDICATIONS  (STANDING):  acetaminophen     Tablet .. 975 milliGRAM(s) Oral <User Schedule>  diphtheria/tetanus/pertussis (acellular) Vaccine (Adacel) 0.5 milliLiter(s) IntraMuscular once  ibuprofen  Tablet. 600 milliGRAM(s) Oral every 6 hours  influenza   Vaccine 0.5 milliLiter(s) IntraMuscular once  levothyroxine 112 MICROGram(s) Oral daily  prenatal multivitamin 1 Tablet(s) Oral daily  sodium chloride 0.9% lock flush 3 milliLiter(s) IV Push every 8 hours    MEDICATIONS  (PRN):  benzocaine 20%/menthol 0.5% Spray 1 Spray(s) Topical every 6 hours PRN for Perineal discomfort  dibucaine 1% Ointment 1 Application(s) Topical every 6 hours PRN Perineal discomfort  diphenhydrAMINE 25 milliGRAM(s) Oral every 6 hours PRN Pruritus  hydrocortisone 1% Cream 1 Application(s) Topical every 6 hours PRN Moderate Pain (4-6)  lanolin Ointment 1 Application(s) Topical every 6 hours PRN nipple soreness  magnesium hydroxide Suspension 30 milliLiter(s) Oral two times a day PRN Constipation  oxyCODONE    IR 5 milliGRAM(s) Oral every 3 hours PRN Moderate to Severe Pain (4-10)  oxyCODONE    IR 5 milliGRAM(s) Oral once PRN Moderate to Severe Pain (4-10)  pramoxine 1%/zinc 5% Cream 1 Application(s) Topical every 4 hours PRN Moderate Pain (4-6)  simethicone 80 milliGRAM(s) Chew every 4 hours PRN Gas  witch hazel Pads 1 Application(s) Topical every 4 hours PRN Perineal discomfort      Labs:  Blood type: A Positive  Rubella IgG: RPR: Negative                          11.2[L]   15.37[H] >-----------< 188    (  @ 05:00 )             34.0[L]                        12.5   18.36[H] >-----------< 212    (  @ 06:25 )             37.4      Physical Exam:  General: NAD  Abdomen: soft, non-tender, non-distended, fundus firm  Vaginal: Lochia wnl  Extremities: No erythema/edema/calf tenderness    A/P: 30yo PPD#2 s/p  with periurethral tear. Patient c/o chest pain/pressure while in labor and delivery; s/p EKG and cardiac enzymes which were reviewed by cardiology and patient subsequently cleared. QBL 97, H/H 12.5/37.4->11.2/34.  Patient is stable and doing well post-partum.      #Post-Partum  - Pain well controlled, continue current pain regimen  - Increase ambulation, SCDs when not ambulating  - Continue regular diet  - Discharge plan-stable for d/c today; F/U 6 weeks for post-partum check    JA Dempsey-BC S: 30yo PPD#2 s/p  with periurethral tear. Patient c/o chest pain/pressure while in labor and delivery; s/p EKG and cardiac enzymes which were reviewed by cardiology and patient subsequently cleared. QBL 97, H/H 12.5/37.4->11.2/34. Patient feels well. Pain is well controlled. She is tolerating a regular diet and passing flatus. She is voiding spontaneously, and ambulating without difficulty. Denies CP/SOB. Denies lightheadedness/dizziness. Denies N/V.    O:  Vitals:   Vital Signs Last 24 Hrs  T(C): 36.8 (2025 05:09), Max: 36.8 (2025 05:09)  T(F): 98.3 (2025 05:09), Max: 98.3 (2025 05:09)  HR: 60 (2025 05:09) (60 - 82)  BP: 120/72 (2025 05:09) (111/66 - 120/72)  BP(mean): --  RR: 18 (2025 05:09) (18 - 18)  SpO2: 99% (2025 05:09) (99% - 100%)    Parameters above as of 2025 05:09  Patient On (Oxygen Delivery Method): room air        MEDICATIONS  (STANDING):  acetaminophen     Tablet .. 975 milliGRAM(s) Oral <User Schedule>  diphtheria/tetanus/pertussis (acellular) Vaccine (Adacel) 0.5 milliLiter(s) IntraMuscular once  ibuprofen  Tablet. 600 milliGRAM(s) Oral every 6 hours  influenza   Vaccine 0.5 milliLiter(s) IntraMuscular once  levothyroxine 112 MICROGram(s) Oral daily  prenatal multivitamin 1 Tablet(s) Oral daily  sodium chloride 0.9% lock flush 3 milliLiter(s) IV Push every 8 hours    MEDICATIONS  (PRN):  benzocaine 20%/menthol 0.5% Spray 1 Spray(s) Topical every 6 hours PRN for Perineal discomfort  dibucaine 1% Ointment 1 Application(s) Topical every 6 hours PRN Perineal discomfort  diphenhydrAMINE 25 milliGRAM(s) Oral every 6 hours PRN Pruritus  hydrocortisone 1% Cream 1 Application(s) Topical every 6 hours PRN Moderate Pain (4-6)  lanolin Ointment 1 Application(s) Topical every 6 hours PRN nipple soreness  magnesium hydroxide Suspension 30 milliLiter(s) Oral two times a day PRN Constipation  oxyCODONE    IR 5 milliGRAM(s) Oral every 3 hours PRN Moderate to Severe Pain (4-10)  oxyCODONE    IR 5 milliGRAM(s) Oral once PRN Moderate to Severe Pain (4-10)  pramoxine 1%/zinc 5% Cream 1 Application(s) Topical every 4 hours PRN Moderate Pain (4-6)  simethicone 80 milliGRAM(s) Chew every 4 hours PRN Gas  witch hazel Pads 1 Application(s) Topical every 4 hours PRN Perineal discomfort      Labs:  Blood type: A Positive  Rubella IgG: RPR: Negative                          11.2[L]   15.37[H] >-----------< 188    (  @ 05:00 )             34.0[L]                        12.5   18.36[H] >-----------< 212    (  @ 06:25 )             37.4      Physical Exam:  General: NAD  Abdomen: soft, non-tender, non-distended, fundus firm  Vaginal: Lochia wnl  Extremities: No erythema/edema/calf tenderness    A/P: 30yo PPD#2 s/p  with periurethral tear. Patient c/o chest pain/pressure while in labor and delivery; s/p EKG and cardiac enzymes which were reviewed by cardiology and patient subsequently cleared. QBL 97, H/H 12.5/37.4->11.2/34.  Patient is stable and doing well post-partum.      #Chest pain/pressure, resolved  - Patient with chest pressure on PPD#0  - EKG () x2: normal sinus rhythm - reviewed by cardiology fellow Ramu Collazo stating no STEMI changes.  - Cardiac enzymes wnl for postpartum  - VSS, patient asymptomatic now  - Conservative management per cardiology    #Post-Partum  - Pain well controlled, continue current pain regimen  - Increase ambulation, SCDs when not ambulating  - Continue regular diet  - Discharge plan-stable for d/c today; F/U 6 weeks for post-partum check    JA Dempsey-BC S: 30yo PPD#2 s/p  with periurethral tear. Patient c/o chest pain/pressure while in labor and delivery; s/p EKG and cardiac enzymes which were reviewed by cardiology and patient subsequently cleared. QBL 97, H/H 12.5/37.4->11.2/34. Patient feels well. Pain is well controlled. She is tolerating a regular diet and passing flatus. She is voiding spontaneously, and ambulating without difficulty. Denies CP/SOB. Denies lightheadedness/dizziness. Denies N/V.    O:  Vitals:   Vital Signs Last 24 Hrs  T(C): 36.8 (2025 05:09), Max: 36.8 (2025 05:09)  T(F): 98.3 (2025 05:09), Max: 98.3 (2025 05:09)  HR: 60 (2025 05:09) (60 - 82)  BP: 120/72 (2025 05:09) (111/66 - 120/72)  BP(mean): --  RR: 18 (2025 05:09) (18 - 18)  SpO2: 99% (2025 05:09) (99% - 100%)    Parameters above as of 2025 05:09  Patient On (Oxygen Delivery Method): room air        MEDICATIONS  (STANDING):  acetaminophen     Tablet .. 975 milliGRAM(s) Oral <User Schedule>  diphtheria/tetanus/pertussis (acellular) Vaccine (Adacel) 0.5 milliLiter(s) IntraMuscular once  ibuprofen  Tablet. 600 milliGRAM(s) Oral every 6 hours  influenza   Vaccine 0.5 milliLiter(s) IntraMuscular once  levothyroxine 112 MICROGram(s) Oral daily  prenatal multivitamin 1 Tablet(s) Oral daily  sodium chloride 0.9% lock flush 3 milliLiter(s) IV Push every 8 hours    MEDICATIONS  (PRN):  benzocaine 20%/menthol 0.5% Spray 1 Spray(s) Topical every 6 hours PRN for Perineal discomfort  dibucaine 1% Ointment 1 Application(s) Topical every 6 hours PRN Perineal discomfort  diphenhydrAMINE 25 milliGRAM(s) Oral every 6 hours PRN Pruritus  hydrocortisone 1% Cream 1 Application(s) Topical every 6 hours PRN Moderate Pain (4-6)  lanolin Ointment 1 Application(s) Topical every 6 hours PRN nipple soreness  magnesium hydroxide Suspension 30 milliLiter(s) Oral two times a day PRN Constipation  oxyCODONE    IR 5 milliGRAM(s) Oral every 3 hours PRN Moderate to Severe Pain (4-10)  oxyCODONE    IR 5 milliGRAM(s) Oral once PRN Moderate to Severe Pain (4-10)  pramoxine 1%/zinc 5% Cream 1 Application(s) Topical every 4 hours PRN Moderate Pain (4-6)  simethicone 80 milliGRAM(s) Chew every 4 hours PRN Gas  witch hazel Pads 1 Application(s) Topical every 4 hours PRN Perineal discomfort      Labs:  Blood type: A Positive  Rubella IgG: RPR: Negative                          11.2[L]   15.37[H] >-----------< 188    (  @ 05:00 )             34.0[L]                        12.5   18.36[H] >-----------< 212    (  @ 06:25 )             37.4      Physical Exam:  General: NAD  Abdomen: soft, non-tender, non-distended, fundus firm  Vaginal: Lochia wnl  Extremities: No erythema/edema/calf tenderness    A/P: 30yo PPD#2 s/p  with periurethral tear. Patient c/o chest pain/pressure while in labor and delivery; s/p EKG and cardiac enzymes which were reviewed by cardiology and patient subsequently cleared. QBL 97, H/H 12.5/37.4->11.2/34.  Patient is stable and doing well post-partum.      #Chest pain/pressure, resolved  - Patient with chest pressure on PPD#0  - EKG () x2: normal sinus rhythm - reviewed by cardiology fellow Ramu Collazo stating no STEMI changes.  - Cardiac enzymes wnl for postpartum  - VSS, patient asymptomatic now  - Conservative management per cardiology    #Post-Partum-  - WBC downtrending 18->15  - Pain well controlled, continue current pain regimen  - Increase ambulation, SCDs when not ambulating  - Continue regular diet  - Discharge plan-stable for d/c today; F/U 6 weeks for post-partum check    JA Dempsey-BC

## 2025-02-20 DIAGNOSIS — K64.9 UNSPECIFIED HEMORRHOIDS: ICD-10-CM

## 2025-02-21 RX ORDER — HYDROCORTISONE ACETATE 25 MG/1
25 SUPPOSITORY RECTAL
Qty: 1 | Refills: 1 | Status: ACTIVE | COMMUNITY
Start: 2025-02-20 | End: 1900-01-01

## 2025-02-21 RX ORDER — DOCUSATE SODIUM 100 MG/1
100 CAPSULE ORAL TWICE DAILY
Qty: 60 | Refills: 0 | Status: ACTIVE | COMMUNITY
Start: 2025-02-20 | End: 1900-01-01

## 2025-02-26 ENCOUNTER — LABORATORY RESULT (OUTPATIENT)
Age: 30
End: 2025-02-26

## 2025-02-26 ENCOUNTER — APPOINTMENT (OUTPATIENT)
Dept: OBGYN | Facility: CLINIC | Age: 30
End: 2025-02-26
Payer: COMMERCIAL

## 2025-02-26 VITALS
HEIGHT: 61 IN | SYSTOLIC BLOOD PRESSURE: 95 MMHG | DIASTOLIC BLOOD PRESSURE: 62 MMHG | BODY MASS INDEX: 24.55 KG/M2 | WEIGHT: 130 LBS

## 2025-02-26 PROCEDURE — 0503F POSTPARTUM CARE VISIT: CPT

## 2025-02-27 ENCOUNTER — NON-APPOINTMENT (OUTPATIENT)
Age: 30
End: 2025-02-27

## 2025-02-27 LAB — TSH SERPL-ACNC: 0.26 UIU/ML

## 2025-05-07 ENCOUNTER — APPOINTMENT (OUTPATIENT)
Dept: OBGYN | Facility: CLINIC | Age: 30
End: 2025-05-07
Payer: COMMERCIAL

## 2025-05-07 DIAGNOSIS — E03.9 HYPOTHYROIDISM, UNSPECIFIED: ICD-10-CM

## 2025-05-07 PROCEDURE — 36415 COLL VENOUS BLD VENIPUNCTURE: CPT

## 2025-05-09 LAB
T3FREE SERPL-MCNC: 1.43 PG/ML
T4 FREE SERPL-MCNC: 0.4 NG/DL
TSH SERPL-ACNC: 103 UIU/ML

## 2025-05-21 ENCOUNTER — APPOINTMENT (OUTPATIENT)
Dept: ENDOCRINOLOGY | Facility: CLINIC | Age: 30
End: 2025-05-21
